# Patient Record
Sex: FEMALE | Employment: FULL TIME | URBAN - METROPOLITAN AREA
[De-identification: names, ages, dates, MRNs, and addresses within clinical notes are randomized per-mention and may not be internally consistent; named-entity substitution may affect disease eponyms.]

---

## 2023-09-08 DIAGNOSIS — M54.50 LUMBAGO: Primary | ICD-10-CM

## 2023-09-13 ENCOUNTER — ANESTHESIA EVENT (OUTPATIENT)
Dept: ENDOSCOPY | Facility: HOSPITAL | Age: 33
DRG: 440 | End: 2023-09-13
Payer: MEDICAID

## 2023-09-13 ENCOUNTER — HOSPITAL ENCOUNTER (INPATIENT)
Facility: HOSPITAL | Age: 33
LOS: 2 days | Discharge: HOME | DRG: 440 | End: 2023-09-15
Attending: STUDENT IN AN ORGANIZED HEALTH CARE EDUCATION/TRAINING PROGRAM | Admitting: STUDENT IN AN ORGANIZED HEALTH CARE EDUCATION/TRAINING PROGRAM
Payer: MEDICAID

## 2023-09-13 DIAGNOSIS — K85.10 ACUTE BILIARY PANCREATITIS WITHOUT INFECTION OR NECROSIS: ICD-10-CM

## 2023-09-13 DIAGNOSIS — R11.2 NAUSEA AND VOMITING, UNSPECIFIED VOMITING TYPE: ICD-10-CM

## 2023-09-13 DIAGNOSIS — R79.89 ELEVATED LFTS: ICD-10-CM

## 2023-09-13 DIAGNOSIS — E80.6 HYPERBILIRUBINEMIA: ICD-10-CM

## 2023-09-13 DIAGNOSIS — K85.90 ACUTE PANCREATITIS, UNSPECIFIED COMPLICATION STATUS, UNSPECIFIED PANCREATITIS TYPE: ICD-10-CM

## 2023-09-13 DIAGNOSIS — R07.9 CHEST PAIN: ICD-10-CM

## 2023-09-13 DIAGNOSIS — R10.13 EPIGASTRIC PAIN: Primary | ICD-10-CM

## 2023-09-13 LAB
ALBUMIN SERPL-MCNC: 4.2 G/DL (ref 3.5–5)
ALBUMIN/GLOB SERPL: 1.4 RATIO (ref 1.1–2)
ALP SERPL-CCNC: 289 UNIT/L (ref 40–150)
ALT SERPL-CCNC: 874 UNIT/L (ref 0–55)
APPEARANCE UR: CLEAR
AST SERPL-CCNC: 1231 UNIT/L (ref 5–34)
B-HCG SERPL QL: NEGATIVE
BACTERIA #/AREA URNS AUTO: NORMAL /HPF
BASOPHILS # BLD AUTO: 0.04 X10(3)/MCL
BASOPHILS NFR BLD AUTO: 0.6 %
BILIRUB SERPL-MCNC: 2.3 MG/DL
BILIRUB UR QL STRIP.AUTO: ABNORMAL
BUN SERPL-MCNC: 10.5 MG/DL (ref 7–18.7)
CALCIUM SERPL-MCNC: 9.5 MG/DL (ref 8.4–10.2)
CHLORIDE SERPL-SCNC: 106 MMOL/L (ref 98–107)
CHOLEST SERPL-MCNC: 176 MG/DL
CHOLEST/HDLC SERPL: 4 {RATIO} (ref 0–5)
CO2 SERPL-SCNC: 19 MMOL/L (ref 22–29)
COLOR UR: ABNORMAL
CREAT SERPL-MCNC: 0.72 MG/DL (ref 0.55–1.02)
EOSINOPHIL # BLD AUTO: 0.03 X10(3)/MCL (ref 0–0.9)
EOSINOPHIL NFR BLD AUTO: 0.5 %
ERYTHROCYTE [DISTWIDTH] IN BLOOD BY AUTOMATED COUNT: 13.3 % (ref 11.5–17)
GFR SERPLBLD CREATININE-BSD FMLA CKD-EPI: >60 MLS/MIN/1.73/M2
GLOBULIN SER-MCNC: 3.1 GM/DL (ref 2.4–3.5)
GLUCOSE SERPL-MCNC: 107 MG/DL (ref 74–100)
GLUCOSE UR QL STRIP.AUTO: NEGATIVE
HAV IGM SERPL QL IA: NONREACTIVE
HBV CORE IGM SERPL QL IA: NONREACTIVE
HBV SURFACE AG SERPL QL IA: NONREACTIVE
HCT VFR BLD AUTO: 38.1 % (ref 37–47)
HCV AB SERPL QL IA: NONREACTIVE
HDLC SERPL-MCNC: 50 MG/DL (ref 35–60)
HGB BLD-MCNC: 12.7 G/DL (ref 12–16)
IMM GRANULOCYTES # BLD AUTO: 0.02 X10(3)/MCL (ref 0–0.04)
IMM GRANULOCYTES NFR BLD AUTO: 0.3 %
KETONES UR QL STRIP.AUTO: NEGATIVE
LDLC SERPL CALC-MCNC: 108 MG/DL (ref 50–140)
LEUKOCYTE ESTERASE UR QL STRIP.AUTO: ABNORMAL
LIPASE SERPL-CCNC: 913 U/L
LYMPHOCYTES # BLD AUTO: 1.78 X10(3)/MCL (ref 0.6–4.6)
LYMPHOCYTES NFR BLD AUTO: 27.1 %
MCH RBC QN AUTO: 28.3 PG (ref 27–31)
MCHC RBC AUTO-ENTMCNC: 33.3 G/DL (ref 33–36)
MCV RBC AUTO: 84.9 FL (ref 80–94)
MONOCYTES # BLD AUTO: 0.39 X10(3)/MCL (ref 0.1–1.3)
MONOCYTES NFR BLD AUTO: 5.9 %
NEUTROPHILS # BLD AUTO: 4.32 X10(3)/MCL (ref 2.1–9.2)
NEUTROPHILS NFR BLD AUTO: 65.6 %
NITRITE UR QL STRIP.AUTO: NEGATIVE
NRBC BLD AUTO-RTO: 0 %
PH UR STRIP.AUTO: 5.5 [PH]
PLATELET # BLD AUTO: 329 X10(3)/MCL (ref 130–400)
PMV BLD AUTO: 10.7 FL (ref 7.4–10.4)
POTASSIUM SERPL-SCNC: 3.9 MMOL/L (ref 3.5–5.1)
PROT SERPL-MCNC: 7.3 GM/DL (ref 6.4–8.3)
PROT UR QL STRIP.AUTO: ABNORMAL
RBC # BLD AUTO: 4.49 X10(6)/MCL (ref 4.2–5.4)
RBC #/AREA URNS AUTO: NORMAL /HPF
RBC UR QL AUTO: ABNORMAL
SODIUM SERPL-SCNC: 135 MMOL/L (ref 136–145)
SP GR UR STRIP.AUTO: 1.03 (ref 1–1.03)
SQUAMOUS #/AREA URNS AUTO: NORMAL /HPF
TRIGL SERPL-MCNC: 89 MG/DL (ref 37–140)
TROPONIN I SERPL-MCNC: <0.01 NG/ML (ref 0–0.04)
UROBILINOGEN UR STRIP-ACNC: 1
VLDLC SERPL CALC-MCNC: 18 MG/DL
WBC # SPEC AUTO: 6.58 X10(3)/MCL (ref 4.5–11.5)
WBC #/AREA URNS AUTO: NORMAL /HPF

## 2023-09-13 PROCEDURE — 21400001 HC TELEMETRY ROOM

## 2023-09-13 PROCEDURE — 80053 COMPREHEN METABOLIC PANEL: CPT | Performed by: STUDENT IN AN ORGANIZED HEALTH CARE EDUCATION/TRAINING PROGRAM

## 2023-09-13 PROCEDURE — 80074 ACUTE HEPATITIS PANEL: CPT | Performed by: PHYSICIAN ASSISTANT

## 2023-09-13 PROCEDURE — 84484 ASSAY OF TROPONIN QUANT: CPT | Performed by: STUDENT IN AN ORGANIZED HEALTH CARE EDUCATION/TRAINING PROGRAM

## 2023-09-13 PROCEDURE — 83690 ASSAY OF LIPASE: CPT | Performed by: STUDENT IN AN ORGANIZED HEALTH CARE EDUCATION/TRAINING PROGRAM

## 2023-09-13 PROCEDURE — 25500020 PHARM REV CODE 255: Performed by: STUDENT IN AN ORGANIZED HEALTH CARE EDUCATION/TRAINING PROGRAM

## 2023-09-13 PROCEDURE — 80061 LIPID PANEL: CPT | Performed by: PHYSICIAN ASSISTANT

## 2023-09-13 PROCEDURE — 25000003 PHARM REV CODE 250: Performed by: STUDENT IN AN ORGANIZED HEALTH CARE EDUCATION/TRAINING PROGRAM

## 2023-09-13 PROCEDURE — 25000003 PHARM REV CODE 250: Performed by: PHYSICIAN ASSISTANT

## 2023-09-13 PROCEDURE — 85025 COMPLETE CBC W/AUTO DIFF WBC: CPT | Performed by: STUDENT IN AN ORGANIZED HEALTH CARE EDUCATION/TRAINING PROGRAM

## 2023-09-13 PROCEDURE — 93010 ELECTROCARDIOGRAM REPORT: CPT | Mod: ,,, | Performed by: INTERNAL MEDICINE

## 2023-09-13 PROCEDURE — 11000001 HC ACUTE MED/SURG PRIVATE ROOM

## 2023-09-13 PROCEDURE — 81001 URINALYSIS AUTO W/SCOPE: CPT | Performed by: STUDENT IN AN ORGANIZED HEALTH CARE EDUCATION/TRAINING PROGRAM

## 2023-09-13 PROCEDURE — 81025 URINE PREGNANCY TEST: CPT | Performed by: STUDENT IN AN ORGANIZED HEALTH CARE EDUCATION/TRAINING PROGRAM

## 2023-09-13 PROCEDURE — 99285 EMERGENCY DEPT VISIT HI MDM: CPT | Mod: 25

## 2023-09-13 PROCEDURE — 93010 EKG 12-LEAD: ICD-10-PCS | Mod: ,,, | Performed by: INTERNAL MEDICINE

## 2023-09-13 PROCEDURE — 93005 ELECTROCARDIOGRAM TRACING: CPT

## 2023-09-13 PROCEDURE — A9577 INJ MULTIHANCE: HCPCS | Performed by: STUDENT IN AN ORGANIZED HEALTH CARE EDUCATION/TRAINING PROGRAM

## 2023-09-13 RX ORDER — SODIUM CHLORIDE 9 MG/ML
INJECTION, SOLUTION INTRAVENOUS CONTINUOUS
Status: DISCONTINUED | OUTPATIENT
Start: 2023-09-13 | End: 2023-09-14

## 2023-09-13 RX ORDER — HYDRALAZINE HYDROCHLORIDE 20 MG/ML
10 INJECTION INTRAMUSCULAR; INTRAVENOUS
Status: DISCONTINUED | OUTPATIENT
Start: 2023-09-13 | End: 2023-09-15 | Stop reason: HOSPADM

## 2023-09-13 RX ORDER — ONDANSETRON 2 MG/ML
4 INJECTION INTRAMUSCULAR; INTRAVENOUS EVERY 6 HOURS PRN
Status: DISCONTINUED | OUTPATIENT
Start: 2023-09-13 | End: 2023-09-15 | Stop reason: HOSPADM

## 2023-09-13 RX ORDER — ACETAMINOPHEN 325 MG/1
650 TABLET ORAL
Status: COMPLETED | OUTPATIENT
Start: 2023-09-13 | End: 2023-09-13

## 2023-09-13 RX ORDER — ZOLPIDEM TARTRATE 10 MG/1
1 TABLET ORAL NIGHTLY PRN
COMMUNITY

## 2023-09-13 RX ORDER — FAMOTIDINE 20 MG/1
40 TABLET, FILM COATED ORAL ONCE
Status: DISCONTINUED | OUTPATIENT
Start: 2023-09-14 | End: 2023-09-15 | Stop reason: HOSPADM

## 2023-09-13 RX ORDER — MORPHINE SULFATE 4 MG/ML
2 INJECTION, SOLUTION INTRAMUSCULAR; INTRAVENOUS EVERY 4 HOURS PRN
Status: DISPENSED | OUTPATIENT
Start: 2023-09-13 | End: 2023-09-15

## 2023-09-13 RX ORDER — AMLODIPINE BESYLATE 5 MG/1
1 TABLET ORAL DAILY
COMMUNITY

## 2023-09-13 RX ORDER — MAG HYDROX/ALUMINUM HYD/SIMETH 200-200-20
30 SUSPENSION, ORAL (FINAL DOSE FORM) ORAL ONCE
Status: COMPLETED | OUTPATIENT
Start: 2023-09-13 | End: 2023-09-13

## 2023-09-13 RX ORDER — AMOXICILLIN 875 MG/1
875 TABLET, FILM COATED ORAL 2 TIMES DAILY
Status: ON HOLD | COMMUNITY
Start: 2023-09-06 | End: 2023-09-15 | Stop reason: HOSPADM

## 2023-09-13 RX ADMIN — SODIUM CHLORIDE: 9 INJECTION, SOLUTION INTRAVENOUS at 11:09

## 2023-09-13 RX ADMIN — ACETAMINOPHEN 325MG 650 MG: 325 TABLET ORAL at 07:09

## 2023-09-13 RX ADMIN — GADOBENATE DIMEGLUMINE 19 ML: 529 INJECTION, SOLUTION INTRAVENOUS at 02:09

## 2023-09-13 RX ADMIN — IOPAMIDOL 100 ML: 755 INJECTION, SOLUTION INTRAVENOUS at 10:09

## 2023-09-13 RX ADMIN — ALUMINUM HYDROXIDE, MAGNESIUM HYDROXIDE, AND SIMETHICONE 30 ML: 200; 200; 20 SUSPENSION ORAL at 07:09

## 2023-09-13 NOTE — ED PROVIDER NOTES
Encounter Date: 9/13/2023    SCRIBE #1 NOTE: I, Laz Villegas, am scribing for, and in the presence of,  Dr. Choe. I have scribed the following portions of the note - the EKG reading. Other sections scribed: HPI, ROS, Physical Exam, MDM, Attending.       History     Chief Complaint   Patient presents with    Abdominal Pain     C/o epigastric pain radiating around right upper abdomen into back, +nausea. States almost feels constipated but having typical BM. Denies vomiting.     34 y/o female presents to ED for epigastric pain radiating to her back onset around 1900 last night while lying on her stomach and reading.  She states she had not just eaten when her pain began.  Pt describes the pain as dull and says she has never had similar pain before.  She complains of nausea as well.  Pt has history of cholecystectomy.  Her LMP was on 8/20.  She denies vomiting, fever, dysuria, hematuria, vaginal bleeding/discharge, cough, chest pain or SOB.    Per chart review, pt was seen in other ED about a year ago for epigastric pain and had elevated liver enzymes.  She had dilated common bile duct, and there was concern for possible passed gall stone at the time.      The history is provided by the patient and medical records.     Review of patient's allergies indicates:  No Known Allergies  Past Medical History:   Diagnosis Date    Hypertension     Migraine headache      Past Surgical History:   Procedure Laterality Date    CHOLECYSTECTOMY       History reviewed. No pertinent family history.  Social History     Tobacco Use    Smoking status: Never     Passive exposure: Never    Smokeless tobacco: Never   Substance Use Topics    Alcohol use: Not Currently     Alcohol/week: 6.0 - 7.0 standard drinks of alcohol     Types: 3 - 4 Cans of beer, 3 Standard drinks or equivalent per week     Comment: states drinks only weekends once or twice a more    Drug use: Never     Review of Systems   Constitutional:  Negative for chills and fever.    HENT:  Negative for congestion, rhinorrhea and sore throat.    Eyes:  Negative for visual disturbance.   Respiratory:  Negative for cough and shortness of breath.    Cardiovascular:  Negative for chest pain and leg swelling.   Gastrointestinal:  Positive for abdominal pain (epigastric) and nausea. Negative for vomiting.   Genitourinary:  Negative for dysuria, hematuria, vaginal bleeding and vaginal discharge.   Musculoskeletal:  Negative for joint swelling.   Skin:  Negative for rash.   Neurological:  Negative for weakness.   Psychiatric/Behavioral:  Negative for confusion.        Physical Exam     Initial Vitals [09/13/23 0653]   BP Pulse Resp Temp SpO2   134/87 100 13 98.3 °F (36.8 °C) 98 %      MAP       --         Physical Exam    Nursing note and vitals reviewed.  Constitutional: She is not diaphoretic. No distress.   Cardiovascular:  Normal rate and regular rhythm.           No murmur heard.  Pulmonary/Chest: Breath sounds normal. No respiratory distress. She has no wheezes. She has no rales.   Abdominal: Abdomen is soft. She exhibits no distension. There is abdominal tenderness (mild epigastric).     Neurological: She is alert.   Psychiatric: She has a normal mood and affect.         ED Course   Procedures  Labs Reviewed   COMPREHENSIVE METABOLIC PANEL - Abnormal; Notable for the following components:       Result Value    Sodium Level 135 (*)     Carbon Dioxide 19 (*)     Glucose Level 107 (*)     Bilirubin Total 2.3 (*)     Alkaline Phosphatase 289 (*)     Alanine Aminotransferase 874 (*)     Aspartate Aminotransferase 1,231 (*)     All other components within normal limits   URINALYSIS, REFLEX TO URINE CULTURE - Abnormal; Notable for the following components:    Protein, UA 1+ (*)     Blood, UA Trace (*)     Bilirubin, UA 2+ (*)     Leukocyte Esterase, UA Trace (*)     All other components within normal limits   LIPASE - Abnormal; Notable for the following components:    Lipase Level 913 (*)     All  other components within normal limits   CBC WITH DIFFERENTIAL - Abnormal; Notable for the following components:    MPV 10.7 (*)     All other components within normal limits   TROPONIN I - Normal   URINALYSIS, MICROSCOPIC - Normal   PREGNANCY TEST, URINE RAPID - Normal   CBC W/ AUTO DIFFERENTIAL    Narrative:     The following orders were created for panel order CBC auto differential.  Procedure                               Abnormality         Status                     ---------                               -----------         ------                     CBC with Differential[[id number]]       Abnormal            Final result                 Please view results for these tests on the individual orders.   LIPID PANEL     EKG Readings: (Independently Interpreted)   Initial Reading: No STEMI. Rhythm: Sinus Tachycardia. Heart Rate: 101. Ectopy: No Ectopy. Conduction: RBBB. ST Segments: Normal ST Segments. T Waves Flipped: III. Axis: Left Harrison Deviation. [facility] ED EKG2 - Other Findings: no ischemic changes.   0653  No prior EKG for comparison     ECG Results              EKG 12-lead (Final result)  Result time 09/13/23 08:44:48      Final result by Interface, Lab In [facility] (09/13/23 08:44:48)                   Narrative:    Test Reason : R10.13,    Vent. Rate : 101 BPM     Atrial Rate : 101 BPM     P-R Int : 148 ms          QRS Dur : 144 ms      QT Int : 388 ms       P-R-T Axes : 053 -38 011 degrees     QTc Int : 503 ms    Sinus tachycardia  Left axis deviation  Right bundle branch block  Inferior infarct ,age undetermined  Abnormal ECG  Confirmed by Chace VALLE, Dedrick ([id number]) on 9/13/2023 8:44:37 AM    Referred By:             Confirmed By:Dedrick Mas MD                                  Imaging Results              MRI Abdomen W WO Contrast_INC MRCP (XPD) (Final result)  Result time 09/13/23 14:53:41      Final result by Alice Gutierrez MD (09/13/23 14:53:41)                   Impression:      1. No biliary ductal  dilatation or evidence of choledocholithiasis  2. Mild pancreatic edema  3. Mild hepatic steatosis      Electronically signed by: Alice Gutierrez  Date:    09/13/2023  Time:    14:53               Narrative:    EXAMINATION:  MRI ABDOMEN WITH AND WO_INC MRCP (XPD)    CLINICAL HISTORY:  Pancreatitis, acute, severe;r/o choledocholithiasis, acute pancreatitis w/ elevated LFTs. s/p lap julius 5/22;    TECHNIQUE:  Multiplanar, multisequence MRI of the abdomen performed prior to and after intravenous administration of gadolinium based contrast.  3D and MIP heavily T2 weighted MRCP sequences included.    COMPARISON:  CT abdomen pelvis 09/13/2023    FINDINGS:  LIVER: Mild loss of signal within the liver on out of phase imaging compatible with mild steatosis.    BILIARY: Gallbladder is surgically absent.  No biliary ductal dilatation.  Common bile duct measures 4-5 mm.  No appreciable intraductal stone.      PANCREAS: Mild pancreatic edema.  No pancreatic ductal dilatation.  No evidence of necrosis.    SPLEEN: Normal.    ADRENALS: Normal.    KIDNEYS: Symmetric. No hydronephrosis.    BOWEL: Unremarkable.    BONES: Normal marrow signal.    OTHER: N/A                                           CT Abdomen Pelvis With Contrast (Final result)  Result time 09/13/23 10:46:32      Final result by Ayad Ng MD (09/13/23 10:46:32)                   Impression:      Mild antral wall thickening/gastritis versus underdistention.    Small amount of free fluid the pelvis, possibly physiologic.    Otherwise, unremarkable CT of the abdomen pelvis.      Electronically signed by: Ayad Ng MD  Date:    09/13/2023  Time:    10:46               Narrative:    EXAMINATION:  CT of the abdomen pelvis with contrast    CLINICAL HISTORY:  Epigastric pain    COMPARISON:  None    TECHNIQUE:  Routine CT of the abdomen pelvis performed with intravenous contrast.  Patient received 100 cc of Isovue 370    Total DLP: 556 mGy.cm    Automatic exposure  control was utilized to reduce the patient's dose    FINDINGS:  The visualized lung bases are clear.    The liver, spleen, pancreas, adrenal glands, and kidneys appear unremarkable.  The gallbladder is absent.    The abdominal aorta normal caliber.  No abdominopelvic adenopathy.    There is underdistention versus mild wall thickening of the stomach antrum.  The bowel loops are normal caliber.  The appendix appears normal.  There is trace free fluid in posterior cul-de-sac, possibly physiologic.  No fluid collection or free air.    No osseous destructive process.                                       Medications   hydrALAZINE injection 10 mg (has no administration in time range)   0.9%  NaCl infusion ( Intravenous New Bag 9/13/23 2328)   ondansetron injection 4 mg (4 mg Intravenous Not Given 9/13/23 1859)   morphine injection 2 mg (2 mg Intravenous Not Given 9/13/23 1858)   famotidine tablet 40 mg (has no administration in time range)   aluminum-magnesium hydroxide-simethicone 200-200-20 mg/5 mL suspension 30 mL (30 mLs Oral Given 9/13/23 0749)   iopamidoL (ISOVUE-370) injection 100 mL (100 mLs Intravenous Given 9/13/23 1039)   gadobenate dimeglumine (MULTIHANCE) injection 19 mL (19 mLs Intravenous Given 9/13/23 1428)   acetaminophen tablet 650 mg (650 mg Oral Given 9/13/23 1915)             Scribe Attestation:   Scribe #1: I performed the above scribed service and the documentation accurately describes the services I performed. I attest to the accuracy of the note.    Attending Attestation:           Physician Attestation for Scribe:  Physician Attestation Statement for Scribe #1: I, reviewed documentation, as scribed by Laz Villegas in my presence, and it is both accurate and complete.         Medical Decision Making  Problems Addressed:  Acute pancreatitis, unspecified complication status, unspecified pancreatitis type: acute illness or injury that poses a threat to life or bodily functions  Elevated LFTs: acute  illness or injury that poses a threat to life or bodily functions  Epigastric pain: acute illness or injury that poses a threat to life or bodily functions  Hyperbilirubinemia: acute illness or injury that poses a threat to life or bodily functions  Nausea and vomiting, unspecified vomiting type: acute illness or injury that poses a threat to life or bodily functions    Amount and/or Complexity of Data Reviewed  Radiology: ordered.    Risk  OTC drugs.  Decision regarding hospitalization.        ED assessment:    34 yo with history of cholecystectomy presenting for epigastric abd pain  Presented a year ago to [facility] with similar pain - had transaminitis and hyperbili, CBD dilatation  Ultitmately MRCP was negative, had elective julius and discharged  Labs today with hyperbili, elevated lipase and LFTs  Will obtain CT, consult Gi and admit to hospitalist     Differential diagnosis (including but not limited to):   Judging by the patient's chief complaint and pertinent history, the patient has the following possible differential diagnoses, including but not limited to the following.  Some of these are deemed to be lower likelihood and some more likely based on my physical exam and history combined with possible lab work and/or imaging studies.   Please see the pertinent studies, and refer to the HPI.  Some of these diagnoses will take further evaluation to fully rule out, perhaps as an outpatient and the patient was encouraged to follow up when discharged for more comprehensive evaluation.    appendicitis, biliary disease, diverticulitis,  AAA, ACS, mesenteric ischemia, intraabdominal abcess, retroperitoneal abcess, gastritis, gastroenteritis, hepatitis, hernia, pancreatitis, inflammatory bowel disease, PUD, SBP, nephrolithiasis, DKA, sickle cell crisis, consitpation, GERD, IBS      ED management:  IVF  Admission  GI consultation       Amount and/or Complexity of Data Reviewed  Independent historian: none  External data  reviewed: notes from previous ED visits, prior labs, prior EKGs, prior imaging, and prescription medications   Summary of data reviewed: Per chart review, pt was seen in other ED about a year ago for epigastric pain and had elevated liver enzymes.  She had dilated common bile duct, and there was concern for possible passed gall stone at the time.    Risk and benefits of testing: discussed   Labs: ordered and reviewed  Radiology: ordered and independent interpretation performed (see above or ED course)  ECG/medicine tests: ordered and independent interpretation performed (see above or ED course)  Discussion of management or test interpretation with external provider(s): discussed with hospitalist physician and discussed with GI consultant   Summary of discussion: see ED course     Risk  Decision regarding hospitalization    Critical Care  none    Harley FORREST MD personally performed the history, PE, MDM, and procedures as documented above and agree with the scribe's documentation.         ED Course as of 09/14/23 0355   Wed Sep 13, 2023   0751 Labs suggestive of choledocholithiasis or biliary obstruction with pancreatitis. Will obtain CT to further assess.  [AC]   0936 There have been delays in obtaining CT scan [AC]   1021 Denies alcohol use  [AC]   1023 Dicussed with GI - they will come evaluate  [AC]      ED Course User Index  [AC] Harley Choe IV, MD                      Clinical Impression:   Final diagnoses:  [R10.13] Epigastric pain (Primary)  [R11.2] Nausea and vomiting, unspecified vomiting type  [K85.90] Acute pancreatitis, unspecified complication status, unspecified pancreatitis type  [R79.89] Elevated LFTs  [E80.6] Hyperbilirubinemia        ED Disposition Condition    Admit Stable                Harley Choe IV, MD  09/14/23 5876

## 2023-09-13 NOTE — PLAN OF CARE
Pt is , 2 children, no living will or POA.    09/13/23 1238   Discharge Assessment   Assessment Type Discharge Planning Assessment   Confirmed/corrected address, phone number and insurance Yes   Confirmed Demographics Contacted registration to update  (notified Heidi of address change: [address] [place])   Source of Information patient   When was your last doctors appointment?   (PCP Pilar Henry)   Communicated DONALD with patient/caregiver Date not available/Unable to determine   People in Home parent(s)   Do you expect to return to your current living situation? Yes   Do you have help at home or someone to help you manage your care at home? Yes   Who are your caregiver(s) and their phone number(s)? lives with father. Identified motherKaley as support [phone]   Prior to hospitilization cognitive status: Unable to Assess   Current cognitive status: Alert/Oriented   Walking or Climbing Stairs   (none)   Dressing/Bathing   (none\)   Home Accessibility wheelchair accessible   Home Layout Able to live on 1st floor   Equipment Currently Used at Home blood pressure machine   Readmission within 30 days? No   Patient currently being followed by outpatient case management? No   Do you currently have service(s) that help you manage your care at home? No   Do you take prescription medications? Yes  (Fills with [facility] [place])   Do you have prescription coverage? Yes  ([employer] medicaid)   Do you have any problems affording any of your prescribed medications? No   Is the patient taking medications as prescribed? yes   Who is going to help you get home at discharge? drive self   How do you get to doctors appointments? car, drives self   Are you on dialysis? No   Do you take coumadin? No   DME Needed Upon Discharge  none   Discharge Plan discussed with: Patient   Transition of Care Barriers None   Discharge Plan A Home with family   Physical Activity   On average, how many days per week do you engage in  moderate to strenuous exercise (like a brisk walk)? 1 day   On average, how many minutes do you engage in exercise at this level? 30 min   Financial Resource Strain   How hard is it for you to pay for the very basics like food, housing, medical care, and heating? Not hard   Housing Stability   In the last 12 months, was there a time when you were not able to pay the mortgage or rent on time? N   In the last 12 months, how many places have you lived? 2   In the last 12 months, was there a time when you did not have a steady place to sleep or slept in a shelter (including now)? N   Transportation Needs   In the past 12 months, has lack of transportation kept you from medical appointments or from getting medications? no   In the past 12 months, has lack of transportation kept you from meetings, work, or from getting things needed for daily living? No   Food Insecurity   Within the past 12 months, you worried that your food would run out before you got the money to buy more. Never true   Within the past 12 months, the food you bought just didn't last and you didn't have money to get more. Never true   Stress   Do you feel stress - tense, restless, nervous, or anxious, or unable to sleep at night because your mind is troubled all the time - these days? Not at all   Social Connections   In a typical week, how many times do you talk on the phone with family, friends, or neighbors? More than 3   How often do you get together with friends or relatives? More than 3   How often do you attend Yazidi or Anabaptist services? More than 4   Do you belong to any clubs or organizations such as Yazidi groups, unions, fraternal or athletic groups, or school groups? No   How often do you attend meetings of the clubs or organizations you belong to? Never   Are you , , , , never , or living with a partner?    Alcohol Use   Q1: How often do you have a drink containing alcohol? 2-4 pr month   Q2:  How many drinks containing alcohol do you have on a typical day when you are drinking? 3 or 4   Q3: How often do you have six or more drinks on one occasion? Never   OTHER   Name(s) of People in Home lives with father

## 2023-09-13 NOTE — H&P
[facility] Hospital Medicine History & Physical Examination       Patient Name: Autumn Alegria  MRN: [id number]  Patient Class: IP- Inpatient   Admission Date: 9/13/2023   Admitting Physician: Raheem Mcfarland MD   Length of Stay: 0  Attending Physician: Raheem Mcfarland MD   Primary Care Provider: Rachel Henry NP  Face-to-Face encounter date: 09/13/2023  Code Status: Full Code  Chief Complaint: Abdominal Pain (C/o epigastric pain radiating around right upper abdomen into back, +nausea. States almost feels constipated but having typical BM. Denies vomiting.)        Patient information was obtained from patient, patient's family, past medical records and ER records.     HISTORY OF PRESENT ILLNESS:   Autumn Alegria is a 33 y.o. female with a past medical history of hypertension. The patient presented to [facility] on 9/13/2023 with a primary complaint of epigastric abdominal pain to the back and associated nausea and chills which began yesterday (09/12/2023) evening. She denies complaints of chest pain, shortness a breath, vomiting, diarrhea, cough.  Patient reports he drinks a few beers every other weekend but never has had history of heavy alcohol use or pancreatitis before. Patient reports history of cholecystectomy approximally year ago.    Upon presentation to the ED, temperature 93° F, heart rate 100, blood pressure 130/87, respiratory rate 17 and SpO2 98% on room air. Labs with alkaline phosphatase 289, total bilirubin 2.3, AST 1231, , lipase 913, troponin less than 0.01.  UA negative for infection.  CT abdomen and pelvis with mild anterior wall thickening/gastritis versus underdistention, small amount of free fluid in the pelvis possibly physiologic but otherwise unremarkable CT.  CT abdomen pelvis with sinus tachycardia with ventricular rate of 101, right bundle-branch block and age-indeterminate inferior infarct. In the ED patient received Mylanta.  Patient is admitted to hospital  medicine services for further medical management.    PAST MEDICAL HISTORY:   Hypertension    PAST SURGICAL HISTORY:   Cholecystectomy  Tonsillectomy    ALLERGIES:   Patient has no known allergies.    FAMILY HISTORY:   Reviewed and negative    SOCIAL HISTORY:   Denies tobacco and illicit drug use   Drinks a few beers every other weekend    HOME MEDICATIONS:     Prior to Admission medications    Medication Sig Start Date End Date Taking? Authorizing Provider   amLODIPine (NORVASC) 5 MG tablet Take 1 tablet by mouth once daily.   Yes Provider, Historical   amoxicillin (AMOXIL) 875 MG tablet Take 875 mg by mouth 2 (two) times daily. 9/6/23  Yes Provider, Historical   zolpidem (AMBIEN) 10 mg Tab 1 tablet nightly as needed.   Yes Provider, Historical       REVIEW OF SYSTEMS:   Except as documented, all other systems reviewed and negative     PHYSICAL EXAM:     VITAL SIGNS: 24 HRS MIN & MAX LAST   Temp  Min: 98.3 °F (36.8 °C)  Max: 98.3 °F (36.8 °C) 98.3 °F (36.8 °C)   BP  Min: 118/94  Max: 135/91 (!) 118/94   Pulse  Min: 73  Max: 100  80   Resp  Min: 13  Max: 23 (!) 23   SpO2  Min: 98 %  Max: 100 % 100 %       General appearance: Well-developed, well-nourished female in no apparent distress.  Boyfriend at bedside.  HEENT: Atraumatic head. Moist mucous membranes of oral cavity.  Lungs: Clear to auscultation bilaterally.   Heart: Regular rate and rhythm.   Abdomen: Soft, non-distended, non-tender. Bowel sounds are normal.   Extremities: No cyanosis, clubbing. No deformities.  Skin: No Rash. Warm and dry.  Neuro: Awake, alert and oriented. Motor and sensory exams grossly intact.  Psych/mental status: Appropriate mood and affect. Cooperative. Responds appropriately to questions.       LABS AND IMAGING:     Recent Labs   Lab 09/13/23  0708   WBC 6.58   RBC 4.49   HGB 12.7   HCT 38.1   MCV 84.9   MCH 28.3   MCHC 33.3   RDW 13.3      MPV 10.7*       Recent Labs   Lab 09/13/23  0708   *   K 3.9   CO2 19*   BUN 10.5    CREATININE 0.72   CALCIUM 9.5   ALBUMIN 4.2   ALKPHOS 289*   *   AST 1,231*   BILITOT 2.3*       Microbiology Results (last 7 days)       ** No results found for the last 168 hours. **             CT Abdomen Pelvis With Contrast  Narrative: EXAMINATION:  CT of the abdomen pelvis with contrast    CLINICAL HISTORY:  Epigastric pain    COMPARISON:  None    TECHNIQUE:  Routine CT of the abdomen pelvis performed with intravenous contrast.  Patient received 100 cc of Isovue 370    Total DLP: 556 mGy.cm    Automatic exposure control was utilized to reduce the patient's dose    FINDINGS:  The visualized lung bases are clear.    The liver, spleen, pancreas, adrenal glands, and kidneys appear unremarkable.  The gallbladder is absent.    The abdominal aorta normal caliber.  No abdominopelvic adenopathy.    There is underdistention versus mild wall thickening of the stomach antrum.  The bowel loops are normal caliber.  The appendix appears normal.  There is trace free fluid in posterior cul-de-sac, possibly physiologic.  No fluid collection or free air.    No osseous destructive process.  Impression: Mild antral wall thickening/gastritis versus underdistention.    Small amount of free fluid the pelvis, possibly physiologic.    Otherwise, unremarkable CT of the abdomen pelvis.    Electronically signed by: Ayad Ng MD  Date:    09/13/2023  Time:    10:46        ASSESSMENT & PLAN:   Assessment:  Acute pancreatitis  Gastritis versus underdistention of the stomach  Transaminitis  Hyperbilirubinemia  History of hypertension    Plan:  - GI consulted an MRCP ordered.  Follow results   - NPO   - IV fluid hydration  - IV morphine as needed for pain  - Lipid panel ordered  - Resume appropriate home medications when deemed necessary   - Labs in AM      VTE Prophylaxis: will be placed on SCD for DVT prophylaxis and will be advised to be as mobile as possible and sit in a chair as  tolerated      __________________________________________________________________________  INPATIENT LIST OF MEDICATIONS     Current Facility-Administered Medications:     0.9%  NaCl infusion, , Intravenous, Continuous, Essence Lunsford PA-C, Last Rate: 125 mL/hr at 09/13/23 1100, New Bag at 09/13/23 1100    hydrALAZINE injection 10 mg, 10 mg, Intravenous, Q2H PRN, Essence Lunsford PA-C    ondansetron injection 4 mg, 4 mg, Intravenous, Q6H PRN, Essence Lunsford PA-C    Current Outpatient Medications:     amLODIPine (NORVASC) 5 MG tablet, Take 1 tablet by mouth once daily., Disp: , Rfl:     amoxicillin (AMOXIL) 875 MG tablet, Take 875 mg by mouth 2 (two) times daily., Disp: , Rfl:     zolpidem (AMBIEN) 10 mg Tab, 1 tablet nightly as needed., Disp: , Rfl:       Scheduled Meds:  Continuous Infusions:   sodium chloride 0.9% 125 mL/hr at 09/13/23 1100     PRN Meds:.hydrALAZINE, ondansetron      Discharge Planning and Disposition: Anticipated discharge to be determined.    IEssence PA, have reviewed and discussed the case with Dr. Raheem Mcfarland MD    Please see the following addendum for further assessment and plan from there attending MD.    Essence Lunsford PA-C  09/13/2023      Seen and examined the patient.  Agree with abovew history physical exam and management.    MRI with and without contrast was ordered and pending results.    GI is consulted.    Continue IV fluids pain management NPO.    Check lipid panel  Advance diet as tolerated.    Raheem Mcfarland M.D.  [facility]/Hospital Medicine Department  [facility]

## 2023-09-13 NOTE — Clinical Note
Diagnosis: Epigastric pain [232711]   Admitting Provider:: MARELY ESPOSITO [[id number]]   Future Attending Provider: MARELY ESPOSITO [[id number]]   Reason for IP Medical Treatment  (Clinical interventions that can only be accomplished in the IP setting? ) :: GI, further workup of LFT elevation   I certify that Inpatient services for greater than or equal to 2 midnights are medically necessary:: Yes   Plans for Post-Acute care--if anticipated (pick the single best option):: A. No post acute care anticipated at this time   Special Needs:: No Special Needs [1]

## 2023-09-13 NOTE — CONSULTS
"Gastroenterology Consult    Reason for Consult:     Acute pancreatitis    HPI:  Autumn Alegria is a 33 y.o. female known to Dr. Dougherty from inpatient care in 2022, w/ pmhx of HTN and inpatient cholecystectomy at [facility] May 2022 for cholelithiasis. We were consulted May 2022 for acute epigastric pain, elevated LFTs. MRCP was negative for choledocholithiasis, therefore we deferred ERCP and she had lap julius 5/30/22 by Dr. Brewer. Tbili was 1.8 upon discharge. States she was in usual state of health after that.     She now presents to the ED w/ acute epigastric pain since 1900 last night. She was lying on her stomach, reading when the pain began. She had not eaten recent meal. Pain radiates into her back. Abd soft. No associated n/v. She takes daily amlodipine and ambien. No anticoags. For the past week she has taken ibuprofen 1-2x/day for low back strain in addition to tylenol and baclofen, but does not typically take a lot of ibuprofen    Tbili 2.3  AST 1231      Lipase 913  CT abd/pelv w/ contrast 9/13/23 "mild antral wall thickening/gastritis vs. Underdistention."      PCP:  No, Primary Doctor    Review of patient's allergies indicates:  No Known Allergies    Current Facility-Administered Medications   Medication Dose Route Frequency Provider Last Rate Last Admin    0.9%  NaCl infusion   Intravenous Continuous Essence Lunsford PA-C        hydrALAZINE injection 10 mg  10 mg Intravenous Q2H PRN Essence Lunsford PA-C        ondansetron injection 4 mg  4 mg Intravenous Q6H PRN Essence Lunsford PA-C         No current outpatient medications on file.     (Not in a hospital admission)      Past Medical History:  No past medical history on file.    Past Surgical History:  No past surgical history on file.    Family History:  No family history on file.    Social History:  Social History     Tobacco Use    Smoking status: Not on file    Smokeless tobacco: Not on file   Substance Use Topics    Alcohol use: " OFFICE VISIT 3/20/2023    CARDIOLOGY CONSULTATION    Chief Complaint   Patient presents with   • Consultation     new pt eval CAD         REFERRING PHYSICIAN    Self referral     REASON FOR CONSULTATION    Coronary artery disease, second opinion     HISTORY OF PRESENT ILLNESS:    Arnaldo Vega is a 57 year old male with a history of coronary artery disease who is here today for consultation and a second opinion regarding his cardiac disease.     In September 2022, he was sent from his PCP to the ED for nonspecific chest pain. He was admitted for NSTEMI and at that time and had a cardiac catheterization which resulted in circumflex/OM stenting with total occlusion of RCA which was collateralized from left coronary artery. There was concern for RCA spontaneous dissection. CT of coronary's was then performed on 10/2022 and RCA was deemed totally occluded. A second catheterization was then performed and noted RCA occulusion at the ostium. Opted for medical management at that time and followed up in 3 months. During his appointment with Dr. Correa in March, he continues to describe his chest pain with exertion. They decided to attempt an additional PCI which was unsuccessful. He was then referred to CTS for bypass. He is currently scheduled for CABG x1 on 03/24/2023.     Today, he continues to complain of chest pain with and without exertion.  He does however feel that he has been able to be more active since stents were placed in 09/2022. He notes that he has twinges in his chest that only last a couple of seconds. He continues to have pain in his neck as well. This only occurs with exertion. After moving furniture for 4-5 minutes, he feels very fatigued. His wife reports that he becomes very pale as well. He can't walk up an incline and hold a conversation at the same time, but he doesn't feel short of breath, mostly just fatigue. 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 He tries to walk his dog as often as  Not on file           Review of Systems:    Review of Systems   Constitutional:  Negative for fever and unexpected weight change.   Respiratory:  Negative for cough and shortness of breath.    Cardiovascular:  Negative for chest pain and leg swelling.   Gastrointestinal:  Positive for abdominal pain. Negative for blood in stool, diarrhea, nausea and vomiting.   Musculoskeletal:  Positive for back pain. Negative for myalgias.   Skin:  Negative for color change and pallor.   Neurological:  Negative for speech difficulty and weakness.   All other systems reviewed and are negative.      Objective:      VITAL SIGNS: 24 HR MIN & MAX LAST  Temp  Min: 98.3 °F (36.8 °C)  Max: 98.3 °F (36.8 °C) 98.3 °F (36.8 °C)  BP  Min: 134/87  Max: 135/91 (!) 135/91  Pulse  Min: 85  Max: 100 85  Resp  Min: 13  Max: 21 (!) 21  SpO2  Min: 98 %  Max: 100 % 98 %    No intake or output data in the 24 hours ending 09/13/23 1022    Physical Exam  Vitals and nursing note reviewed.   Constitutional:       Appearance: Normal appearance.   HENT:      Head: Normocephalic and atraumatic.   Eyes:      General: No scleral icterus.     Extraocular Movements: Extraocular movements intact.   Cardiovascular:      Rate and Rhythm: Normal rate and regular rhythm.      Heart sounds: Normal heart sounds.   Pulmonary:      Effort: Pulmonary effort is normal. No respiratory distress.      Breath sounds: Normal breath sounds.   Abdominal:      General: Abdomen is flat. There is no distension.      Palpations: Abdomen is soft.      Tenderness: There is abdominal tenderness.   Musculoskeletal:         General: No swelling or deformity. Normal range of motion.      Right lower leg: No edema.      Left lower leg: No edema.   Skin:     General: Skin is warm and dry.   Neurological:      General: No focal deficit present.      Mental Status: She is alert and oriented to person, place, and time.   Psychiatric:         Mood and Affect: Mood normal.         Behavior:  he can.     He has a history of tobacco abuse, quit about 7 years ago. He snores at night, and he has a history of sleep apnea. He notes that he is unable to use a CPAP due to intolerance with the mask.     Cardiac Cath 3/9/2022  Left main: Nonobstructive  LAD: Proximal portion 70% stenosis by IVUS (minimal luminal area 3.5 mm²), distal vessel with mild irregularity.  LCx: Patent prior stent from circumflex to OM  RCA: Occluded at the ostium, distal vessel received collateral from left coronary artery, grade 2    Cath: 12/01/2022  Cath: 12/1/2022 - Coronary angiogram, failed angioplasty of RCA  The conscious sedation was achieved by intravenous administration Versed and fentanyl face-to-face contact time 60 minutes Heparin used anticoagulant.  Angio-Seal used No complication Findings: Left main: No obstruction. LAD: Proximal portion 30% stenosis, distal vessel with diffuse atherosclerosis. Left circumflex: Patent prior stents RCA: Occluded at the ostium, receiving collateral from right coronary artery.    Cath: 9/7/2022 -Left Heart Cath W Poss PCI Left heart cath, coronary angiogram, attempted PCI of the right coronary artery and a failed likely due to spontaneous dissection. 3.0x28 Xience stent placed at the circumflex, 2.5x26 Orsiro stent placed at OM 3. The conscious sedation was achieved by intravenously administration Versed and fentanyl, the face-to-face contact time 35 minutes.  Findings: Left main: No obstruction. Left circumflex: At midportion 70 to 75% hazy stenosis. Distal circumflex supply collateral to the right coronary artery OM 3 large at the proximal to midportion has 80% stenosis.  LAD: Mild calcification the proximal portion with diffuse atherosclerosis and tightest stenosis about 30%. RCA, occluded at the proximal portion with evidence suggest spontaneous dissection. Elevated LVEDP at 18mmHg    CTA Coronary 10/31/2022  CTA Coronary: 10/31/2022 - 1. Dilated proximal RCA without visible flow in  Behavior normal.         Recent Results (from the past 48 hour(s))   Urinalysis, Reflex to Urine Culture    Collection Time: 09/13/23  6:56 AM    Specimen: Urine   Result Value Ref Range    Color, UA Dark Yellow Yellow, Light-Yellow, Dark Yellow, Joi, Straw    Appearance, UA Clear Clear    Specific Gravity, UA 1.030 1.005 - 1.030    pH, UA 5.5 5.0 - 8.5    Protein, UA 1+ (A) Negative    Glucose, UA Negative Negative, Normal    Ketones, UA Negative Negative    Blood, UA Trace (A) Negative    Bilirubin, UA 2+ (A) Negative    Urobilinogen, UA 1.0 0.2, 1.0, Normal    Nitrites, UA Negative Negative    Leukocyte Esterase, UA Trace (A) Negative   Urinalysis, Microscopic    Collection Time: 09/13/23  6:56 AM   Result Value Ref Range    RBC, UA 0-5 None Seen, 0-2, 3-5, 0-5 /HPF    WBC, UA 0-5 None Seen, 0-2, 3-5, 0-5 /HPF    Squamous Epithelial Cells, UA 0-4 0-4, None Seen /HPF    Bacteria, UA None Seen None Seen, Rare, Occasional /HPF   Pregnancy, urine rapid    Collection Time: 09/13/23  6:56 AM   Result Value Ref Range    Beta hCG Qualitative, Urine Negative Negative   Comprehensive metabolic panel    Collection Time: 09/13/23  7:08 AM   Result Value Ref Range    Sodium Level 135 (L) 136 - 145 mmol/L    Potassium Level 3.9 3.5 - 5.1 mmol/L    Chloride 106 98 - 107 mmol/L    Carbon Dioxide 19 (L) 22 - 29 mmol/L    Glucose Level 107 (H) 74 - 100 mg/dL    Blood Urea Nitrogen 10.5 7.0 - 18.7 mg/dL    Creatinine 0.72 0.55 - 1.02 mg/dL    Calcium Level Total 9.5 8.4 - 10.2 mg/dL    Protein Total 7.3 6.4 - 8.3 gm/dL    Albumin Level 4.2 3.5 - 5.0 g/dL    Globulin 3.1 2.4 - 3.5 gm/dL    Albumin/Globulin Ratio 1.4 1.1 - 2.0 ratio    Bilirubin Total 2.3 (H) <=1.5 mg/dL    Alkaline Phosphatase 289 (H) 40 - 150 unit/L    Alanine Aminotransferase 874 (H) 0 - 55 unit/L    Aspartate Aminotransferase 1,231 (H) 5 - 34 unit/L    eGFR >60 mls/min/1.73/m2   Lipase    Collection Time: 09/13/23  7:08 AM   Result Value Ref Range    Lipase  the lumen. Given the history, this probably represents the sequelae of the known dissection with chronic thrombus. 2. Appropriate opacification of the mid and distal RCA which could be due to thin nonvisualized flow through the dissected segment or retrograde flow. 3. Appropriate opacification distal to the circumflex artery stent, suggesting patency. 4.Significant calcified and mild noncalcified atherosclerotic plaque throughout the coronary arteries, including up to 75 percent luminal narrowing in the mid and distal RCA, as detailed above. 5. Within the limitations of CT, normal LEFT ventricular wall motion  with a calculated ejection fraction of 76 percent.     PAST MEDICAL HISTORY:    There are no problems to display for this patient.      PAST SURGICAL HISTORY:    No past surgical history on file.    SOCIAL HISTORY:  Social History     Socioeconomic History   • Marital status: /Civil Union     Spouse name: Not on file   • Number of children: Not on file   • Years of education: Not on file   • Highest education level: Not on file   Occupational History   • Not on file   Tobacco Use   • Smoking status: Former     Types: Cigarettes     Quit date: 3/1/2015     Years since quittin.0   • Smokeless tobacco: Never   Substance and Sexual Activity   • Alcohol use: Not on file   • Drug use: Not on file   • Sexual activity: Not on file   Other Topics Concern   • Not on file   Social History Narrative   • Not on file     Social Determinants of Health     Financial Resource Strain: Not on file   Food Insecurity: Not on file   Transportation Needs: Not on file   Physical Activity: Not on file   Stress: Not on file   Social Connections: Not on file   Intimate Partner Violence: Not on file       FAMILY HISTORY:  family history is not on file.    MEDICATIONS:  Outpatient Medications Marked as Taking for the 3/20/23 encounter (Office Visit) with Marcelino Vaca MD   Medication Sig Dispense Refill   • isosorbide  Level 913 (H) <=60 U/L   CBC with Differential    Collection Time: 09/13/23  7:08 AM   Result Value Ref Range    WBC 6.58 4.50 - 11.50 x10(3)/mcL    RBC 4.49 4.20 - 5.40 x10(6)/mcL    Hgb 12.7 12.0 - 16.0 g/dL    Hct 38.1 37.0 - 47.0 %    MCV 84.9 80.0 - 94.0 fL    MCH 28.3 27.0 - 31.0 pg    MCHC 33.3 33.0 - 36.0 g/dL    RDW 13.3 11.5 - 17.0 %    Platelet 329 130 - 400 x10(3)/mcL    MPV 10.7 (H) 7.4 - 10.4 fL    Neut % 65.6 %    Lymph % 27.1 %    Mono % 5.9 %    Eos % 0.5 %    Basophil % 0.6 %    Lymph # 1.78 0.6 - 4.6 x10(3)/mcL    Neut # 4.32 2.1 - 9.2 x10(3)/mcL    Mono # 0.39 0.1 - 1.3 x10(3)/mcL    Eos # 0.03 0 - 0.9 x10(3)/mcL    Baso # 0.04 <=0.2 x10(3)/mcL    IG# 0.02 0 - 0.04 x10(3)/mcL    IG% 0.3 %    NRBC% 0.0 %   Troponin I    Collection Time: 09/13/23  7:08 AM   Result Value Ref Range    Troponin-I <0.010 0.000 - 0.045 ng/mL       No results found.      Assessment & Plan:  33 y.o. female known to Dr. Dougherty from inpatient care in 2022, w/ pmhx of HTN and inpatient cholecystectomy at [facility] May 2022 for cholelithiasis. Admitted with acute pancreatitis    Acute pancreatitis  Elevated LFTs  - 2/3 diagnostic criteria med given lipase >900 and epigastric pain radiating into back c/w pancreatitis, regardless of CT results  - etiology likely biliary. obtain lipid panel for thoroughness. no medication culprits. Minimal to moderate etoh use, she drinks a few beers on weekends. Does not seem enough etoh use to induce pancreatitis. Given elevated LFTs very suspicious for choledocholithiasis   - tbili 2.3  - AST 1231  -   -     Recent cholecystectomy  - May 2022 there was concern for choledocholithiasis, tbili was 2.2 at [facility]. MRCP negative for choledocholithiasis.  - S/p lap julius 5/30/22    CT abd/pelv w/ contrast  today Is negative for biliary dilation. Obtain MRCP    - will discuss timing for ERCP w/ Dr. Martinez and GI team if MRCP reveals biliary dilation or obstruction  - continue supportive  mononitrate (IMDUR) 30 MG 24 hr tablet 30mg twice daily     • atorvastatin (LIPITOR) 80 MG tablet 80mg daily     • clopidogrel (PLAVIX) 75 MG tablet 75mg daily     • metoPROLOL succinate (TOPROL-XL) 25 MG 24 hr tablet 25mg daily     • aspirin 81 MG EC tablet Take 81 mg by mouth daily.     • metFORMIN (GLUCOPHAGE-XR) 500 MG 24 hr tablet 500mg daily with breakfast     • Cholecalciferol (VITAMIN D-3 PO) Daily         ALLERGIES:  No Known Allergies    I have personally reviewed and updated the following EPIC sections: Current medications, Allergies, Problem list, Past Medical History, Past Surgical History, Social History and Family History    REVIEW OF SYSTEMS:   Constitutional: There is no history of fevers or chills. + positive for fatigue   Eyes: Patient denies blurred vision.  ENMT: No history of ear pain.  Cardiovascular: + for atypical \"chest tightness\"  Respiratory: There is no history of shortness of breath.  Gastrointestinal: No history of abdominal pain, nausea or vomiting.  Genitourinary: There is no history of dysuria.  Musculoskeletal: Denies history of new joint pain.   Skin: No history of skin rashes.  Neurologic: No history of numbness, tingling or weakness.  All other systems that were reviewed are negative.     LABS:  No results for input(s): HGB, BUN, CREATININE, HGBA1C, CRP, POTASSIUM, NTPROB, BNP, CHOLESTEROL, HDL, CHOHDL, TRIGLYCERIDE, CALCLDL, AST, GPT, INR, DIG, LDLDIR, TSH in the last 8765 hours.      VITAL SIGNS:  Vitals:    03/20/23 1616   BP: 116/77   Pulse: 77       PHYSICAL ASSESSMENT:   CONSTITUTIONAL:  Well-developed, well-nourished.  PSYCHIATRIC/NEUROLOGIC:  Comfortable and in no apparent distress.  Alert and oriented x3.  In a good mood.  SKIN:  Soft, warm, and dry, without rashes or bruises.    LUNGS:  Respiratory effort is unlabored.  Lungs are clear without wheezing or crackles.  CARDIAC EXAM:   The PMI is non-displaced.  Auscultation reveals a normal S1, normal S2.  No S3 or S4.   No murmurs, clicks, or pericardial friction rub.   EXTREMITIES:  Without clubbing, cyanosis or edema.  Femoral and pedal pulses intact.       ASSESSMENT/PLAN:    Coronary Artery Disease: Per most recent cardiac cath on 03/09/2022. Nonobstructive disease in left main, LAD proximal portion 70% stenosis by IVUS, Lcx patent prior from circ to OM, RCA: Occluded at the ostium, distal vessel received collateral from left coronary artery, grade 2. IVUS of the LAD showed concentric most likely plaque at the proximal LAD with minimal luminal area of 3.5 mm² and a stenosis 71.6%. EF per recent Echo normal. Patient continues to experience symptoms such as pain in his neck with exertion. He reports twinges in his chest which last for a few seconds. He also continues to note fatigue and activity intolerance. He also is having atypical symptoms such as headache. He is scheduled to undergo CABG x1 on 03/24 at [facility]. We discussed today proceeding with bypass vs more non-invasive work up, including treadmill stress test. We are unable to view the disks from his cardiac cath at [facility] unfortunately. We discussed that we would attempt to quickly obtain these records and decide from there which the best option would be. He will attempt to schedule the stress test in the meantime.     Sleep apnea: Non-compliant with CPAP.     Hx of tobacco abuse: Quit 7 years ago.      I will see the patient back in 2 Weeks or sooner if problems arise.    On 3/20/2023, IDanielle RN scribed the services personally performed by Marcelino Vaca MD.     care, IV fluids, pain control. NPO for now  - trend daily LFTs     Thank you for allowing us to participate in this patient's care.    Meghan Oviedo PA-C  [facility], [facility]

## 2023-09-14 ENCOUNTER — ANESTHESIA (OUTPATIENT)
Dept: ENDOSCOPY | Facility: HOSPITAL | Age: 33
DRG: 440 | End: 2023-09-14
Payer: MEDICAID

## 2023-09-14 PROBLEM — E80.6 HYPERBILIRUBINEMIA: Status: ACTIVE | Noted: 2023-09-14

## 2023-09-14 PROBLEM — R79.89 ELEVATED LFTS: Status: ACTIVE | Noted: 2023-09-14

## 2023-09-14 PROBLEM — K80.50 CHOLEDOCHOLITHIASIS: Status: ACTIVE | Noted: 2023-09-14

## 2023-09-14 PROBLEM — R11.2 NAUSEA AND VOMITING: Status: ACTIVE | Noted: 2023-09-14

## 2023-09-14 PROBLEM — K85.90 ACUTE PANCREATITIS: Status: ACTIVE | Noted: 2023-09-14

## 2023-09-14 PROBLEM — R10.13 EPIGASTRIC PAIN: Status: ACTIVE | Noted: 2023-09-14

## 2023-09-14 LAB
ALBUMIN SERPL-MCNC: 3.6 G/DL (ref 3.5–5)
ALBUMIN/GLOB SERPL: 1.4 RATIO (ref 1.1–2)
ALP SERPL-CCNC: 284 UNIT/L (ref 40–150)
ALT SERPL-CCNC: 619 UNIT/L (ref 0–55)
AST SERPL-CCNC: 408 UNIT/L (ref 5–34)
BASOPHILS # BLD AUTO: 0.03 X10(3)/MCL
BASOPHILS NFR BLD AUTO: 0.7 %
BILIRUB SERPL-MCNC: 3.1 MG/DL
BUN SERPL-MCNC: 6.5 MG/DL (ref 7–18.7)
CALCIUM SERPL-MCNC: 8.9 MG/DL (ref 8.4–10.2)
CHLORIDE SERPL-SCNC: 109 MMOL/L (ref 98–107)
CO2 SERPL-SCNC: 20 MMOL/L (ref 22–29)
CREAT SERPL-MCNC: 0.62 MG/DL (ref 0.55–1.02)
EOSINOPHIL # BLD AUTO: 0.04 X10(3)/MCL (ref 0–0.9)
EOSINOPHIL NFR BLD AUTO: 0.9 %
ERYTHROCYTE [DISTWIDTH] IN BLOOD BY AUTOMATED COUNT: 13.5 % (ref 11.5–17)
GFR SERPLBLD CREATININE-BSD FMLA CKD-EPI: >60 MLS/MIN/1.73/M2
GLOBULIN SER-MCNC: 2.6 GM/DL (ref 2.4–3.5)
GLUCOSE SERPL-MCNC: 90 MG/DL (ref 74–100)
HCT VFR BLD AUTO: 33.4 % (ref 37–47)
HGB BLD-MCNC: 11.5 G/DL (ref 12–16)
IMM GRANULOCYTES # BLD AUTO: 0.01 X10(3)/MCL (ref 0–0.04)
IMM GRANULOCYTES NFR BLD AUTO: 0.2 %
INR PPP: 1
LYMPHOCYTES # BLD AUTO: 1.79 X10(3)/MCL (ref 0.6–4.6)
LYMPHOCYTES NFR BLD AUTO: 41.2 %
MCH RBC QN AUTO: 29.3 PG (ref 27–31)
MCHC RBC AUTO-ENTMCNC: 34.4 G/DL (ref 33–36)
MCV RBC AUTO: 85 FL (ref 80–94)
MONOCYTES # BLD AUTO: 0.26 X10(3)/MCL (ref 0.1–1.3)
MONOCYTES NFR BLD AUTO: 6 %
NEUTROPHILS # BLD AUTO: 2.21 X10(3)/MCL (ref 2.1–9.2)
NEUTROPHILS NFR BLD AUTO: 51 %
NRBC BLD AUTO-RTO: 0 %
PLATELET # BLD AUTO: 260 X10(3)/MCL (ref 130–400)
PMV BLD AUTO: 10.9 FL (ref 7.4–10.4)
POTASSIUM SERPL-SCNC: 3.9 MMOL/L (ref 3.5–5.1)
PROT SERPL-MCNC: 6.2 GM/DL (ref 6.4–8.3)
PROTHROMBIN TIME: 13.3 SECONDS (ref 12.5–14.5)
RBC # BLD AUTO: 3.93 X10(6)/MCL (ref 4.2–5.4)
SODIUM SERPL-SCNC: 136 MMOL/L (ref 136–145)
WBC # SPEC AUTO: 4.34 X10(3)/MCL (ref 4.5–11.5)

## 2023-09-14 PROCEDURE — 37000009 HC ANESTHESIA EA ADD 15 MINS: Performed by: INTERNAL MEDICINE

## 2023-09-14 PROCEDURE — 43264 ERCP REMOVE DUCT CALCULI: CPT | Performed by: INTERNAL MEDICINE

## 2023-09-14 PROCEDURE — 85610 PROTHROMBIN TIME: CPT | Performed by: STUDENT IN AN ORGANIZED HEALTH CARE EDUCATION/TRAINING PROGRAM

## 2023-09-14 PROCEDURE — 63600175 PHARM REV CODE 636 W HCPCS: Performed by: NURSE PRACTITIONER

## 2023-09-14 PROCEDURE — 63600175 PHARM REV CODE 636 W HCPCS: Performed by: NURSE ANESTHETIST, CERTIFIED REGISTERED

## 2023-09-14 PROCEDURE — 63600175 PHARM REV CODE 636 W HCPCS: Performed by: INTERNAL MEDICINE

## 2023-09-14 PROCEDURE — D9220A PRA ANESTHESIA: ICD-10-PCS | Mod: ANES,,, | Performed by: ANESTHESIOLOGY

## 2023-09-14 PROCEDURE — D9220A PRA ANESTHESIA: ICD-10-PCS | Mod: CRNA,,, | Performed by: NURSE ANESTHETIST, CERTIFIED REGISTERED

## 2023-09-14 PROCEDURE — D9220A PRA ANESTHESIA: Mod: ANES,,, | Performed by: ANESTHESIOLOGY

## 2023-09-14 PROCEDURE — 80053 COMPREHEN METABOLIC PANEL: CPT | Performed by: STUDENT IN AN ORGANIZED HEALTH CARE EDUCATION/TRAINING PROGRAM

## 2023-09-14 PROCEDURE — C1769 GUIDE WIRE: HCPCS | Performed by: INTERNAL MEDICINE

## 2023-09-14 PROCEDURE — 43262 ENDO CHOLANGIOPANCREATOGRAPH: CPT | Performed by: INTERNAL MEDICINE

## 2023-09-14 PROCEDURE — 63600175 PHARM REV CODE 636 W HCPCS: Performed by: ANESTHESIOLOGY

## 2023-09-14 PROCEDURE — 21400001 HC TELEMETRY ROOM

## 2023-09-14 PROCEDURE — 27201423 OPTIME MED/SURG SUP & DEVICES STERILE SUPPLY: Performed by: INTERNAL MEDICINE

## 2023-09-14 PROCEDURE — 85025 COMPLETE CBC W/AUTO DIFF WBC: CPT | Performed by: STUDENT IN AN ORGANIZED HEALTH CARE EDUCATION/TRAINING PROGRAM

## 2023-09-14 PROCEDURE — 37000008 HC ANESTHESIA 1ST 15 MINUTES: Performed by: INTERNAL MEDICINE

## 2023-09-14 PROCEDURE — D9220A PRA ANESTHESIA: Mod: CRNA,,, | Performed by: NURSE ANESTHETIST, CERTIFIED REGISTERED

## 2023-09-14 PROCEDURE — 25000003 PHARM REV CODE 250: Performed by: NURSE ANESTHETIST, CERTIFIED REGISTERED

## 2023-09-14 RX ORDER — NALOXONE HCL 0.4 MG/ML
0.02 VIAL (ML) INJECTION
Status: DISCONTINUED | OUTPATIENT
Start: 2023-09-14 | End: 2023-09-15 | Stop reason: HOSPADM

## 2023-09-14 RX ORDER — ONDANSETRON 2 MG/ML
INJECTION INTRAMUSCULAR; INTRAVENOUS
Status: DISCONTINUED | OUTPATIENT
Start: 2023-09-14 | End: 2023-09-14

## 2023-09-14 RX ORDER — IBUPROFEN 200 MG
16 TABLET ORAL
Status: DISCONTINUED | OUTPATIENT
Start: 2023-09-14 | End: 2023-09-15 | Stop reason: HOSPADM

## 2023-09-14 RX ORDER — ROCURONIUM BROMIDE 10 MG/ML
INJECTION, SOLUTION INTRAVENOUS
Status: DISCONTINUED | OUTPATIENT
Start: 2023-09-14 | End: 2023-09-14

## 2023-09-14 RX ORDER — SODIUM CHLORIDE 0.9 % (FLUSH) 0.9 %
10 SYRINGE (ML) INJECTION EVERY 12 HOURS PRN
Status: DISCONTINUED | OUTPATIENT
Start: 2023-09-14 | End: 2023-09-15 | Stop reason: HOSPADM

## 2023-09-14 RX ORDER — INDOMETHACIN 50 MG/1
100 SUPPOSITORY RECTAL SEE ADMIN INSTRUCTIONS
Status: CANCELLED | OUTPATIENT
Start: 2023-09-14

## 2023-09-14 RX ORDER — GLYCOPYRROLATE 0.2 MG/ML
INJECTION INTRAMUSCULAR; INTRAVENOUS
Status: DISCONTINUED | OUTPATIENT
Start: 2023-09-14 | End: 2023-09-14

## 2023-09-14 RX ORDER — ONDANSETRON 2 MG/ML
4 INJECTION INTRAMUSCULAR; INTRAVENOUS ONCE AS NEEDED
Status: DISCONTINUED | OUTPATIENT
Start: 2023-09-14 | End: 2023-09-15 | Stop reason: HOSPADM

## 2023-09-14 RX ORDER — FAMOTIDINE 20 MG/1
40 TABLET, FILM COATED ORAL ONCE
Status: CANCELLED | OUTPATIENT
Start: 2023-09-14

## 2023-09-14 RX ORDER — SODIUM CHLORIDE, SODIUM GLUCONATE, SODIUM ACETATE, POTASSIUM CHLORIDE AND MAGNESIUM CHLORIDE 30; 37; 368; 526; 502 MG/100ML; MG/100ML; MG/100ML; MG/100ML; MG/100ML
INJECTION, SOLUTION INTRAVENOUS CONTINUOUS
Status: CANCELLED | OUTPATIENT
Start: 2023-09-14 | End: 2023-10-14

## 2023-09-14 RX ORDER — MIDAZOLAM HYDROCHLORIDE 1 MG/ML
INJECTION INTRAMUSCULAR; INTRAVENOUS
Status: DISPENSED
Start: 2023-09-14 | End: 2023-09-14

## 2023-09-14 RX ORDER — SODIUM CHLORIDE, SODIUM LACTATE, POTASSIUM CHLORIDE, CALCIUM CHLORIDE 600; 310; 30; 20 MG/100ML; MG/100ML; MG/100ML; MG/100ML
INJECTION, SOLUTION INTRAVENOUS CONTINUOUS
Status: DISCONTINUED | OUTPATIENT
Start: 2023-09-14 | End: 2023-09-15 | Stop reason: HOSPADM

## 2023-09-14 RX ORDER — MIDAZOLAM HYDROCHLORIDE 1 MG/ML
2 INJECTION INTRAMUSCULAR; INTRAVENOUS ONCE
Status: COMPLETED | OUTPATIENT
Start: 2023-09-14 | End: 2023-09-14

## 2023-09-14 RX ORDER — SODIUM CHLORIDE, SODIUM GLUCONATE, SODIUM ACETATE, POTASSIUM CHLORIDE AND MAGNESIUM CHLORIDE 30; 37; 368; 526; 502 MG/100ML; MG/100ML; MG/100ML; MG/100ML; MG/100ML
INJECTION, SOLUTION INTRAVENOUS CONTINUOUS
Status: DISCONTINUED | OUTPATIENT
Start: 2023-09-14 | End: 2023-09-14

## 2023-09-14 RX ORDER — MIDAZOLAM HYDROCHLORIDE 1 MG/ML
INJECTION INTRAMUSCULAR; INTRAVENOUS
Status: DISCONTINUED | OUTPATIENT
Start: 2023-09-14 | End: 2023-09-14

## 2023-09-14 RX ORDER — FENTANYL CITRATE 50 UG/ML
INJECTION, SOLUTION INTRAMUSCULAR; INTRAVENOUS
Status: DISCONTINUED | OUTPATIENT
Start: 2023-09-14 | End: 2023-09-14

## 2023-09-14 RX ORDER — GLUCAGON 1 MG
1 KIT INJECTION
Status: DISCONTINUED | OUTPATIENT
Start: 2023-09-14 | End: 2023-09-15 | Stop reason: HOSPADM

## 2023-09-14 RX ORDER — INDOMETHACIN 50 MG/1
SUPPOSITORY RECTAL
Status: DISPENSED
Start: 2023-09-14 | End: 2023-09-15

## 2023-09-14 RX ORDER — PROPOFOL 10 MG/ML
VIAL (ML) INTRAVENOUS
Status: DISCONTINUED | OUTPATIENT
Start: 2023-09-14 | End: 2023-09-14

## 2023-09-14 RX ORDER — ENOXAPARIN SODIUM 100 MG/ML
40 INJECTION SUBCUTANEOUS EVERY 24 HOURS
Status: DISCONTINUED | OUTPATIENT
Start: 2023-09-14 | End: 2023-09-14

## 2023-09-14 RX ORDER — ACETAMINOPHEN 10 MG/ML
1000 INJECTION, SOLUTION INTRAVENOUS ONCE
Status: COMPLETED | OUTPATIENT
Start: 2023-09-14 | End: 2023-09-14

## 2023-09-14 RX ORDER — LIDOCAINE HYDROCHLORIDE 20 MG/ML
INJECTION, SOLUTION EPIDURAL; INFILTRATION; INTRACAUDAL; PERINEURAL
Status: DISCONTINUED | OUTPATIENT
Start: 2023-09-14 | End: 2023-09-14

## 2023-09-14 RX ORDER — IBUPROFEN 200 MG
24 TABLET ORAL
Status: DISCONTINUED | OUTPATIENT
Start: 2023-09-14 | End: 2023-09-15 | Stop reason: HOSPADM

## 2023-09-14 RX ADMIN — SODIUM CHLORIDE, POTASSIUM CHLORIDE, SODIUM LACTATE AND CALCIUM CHLORIDE: 600; 310; 30; 20 INJECTION, SOLUTION INTRAVENOUS at 01:09

## 2023-09-14 RX ADMIN — FENTANYL CITRATE 100 MCG: 50 INJECTION, SOLUTION INTRAMUSCULAR; INTRAVENOUS at 01:09

## 2023-09-14 RX ADMIN — ACETAMINOPHEN 1000 MG: 10 INJECTION, SOLUTION INTRAVENOUS at 05:09

## 2023-09-14 RX ADMIN — SUGAMMADEX 200 MG: 100 INJECTION, SOLUTION INTRAVENOUS at 01:09

## 2023-09-14 RX ADMIN — ROCURONIUM BROMIDE 50 MG: 10 SOLUTION INTRAVENOUS at 01:09

## 2023-09-14 RX ADMIN — FENTANYL CITRATE 50 MCG: 50 INJECTION, SOLUTION INTRAMUSCULAR; INTRAVENOUS at 01:09

## 2023-09-14 RX ADMIN — ONDANSETRON 4 MG: 2 INJECTION INTRAMUSCULAR; INTRAVENOUS at 01:09

## 2023-09-14 RX ADMIN — MIDAZOLAM HYDROCHLORIDE 2 MG: 1 INJECTION, SOLUTION INTRAMUSCULAR; INTRAVENOUS at 11:09

## 2023-09-14 RX ADMIN — SODIUM CHLORIDE, POTASSIUM CHLORIDE, SODIUM LACTATE AND CALCIUM CHLORIDE: 600; 310; 30; 20 INJECTION, SOLUTION INTRAVENOUS at 08:09

## 2023-09-14 RX ADMIN — PROPOFOL 150 MG: 10 INJECTION, EMULSION INTRAVENOUS at 01:09

## 2023-09-14 RX ADMIN — SODIUM CHLORIDE, POTASSIUM CHLORIDE, SODIUM LACTATE AND CALCIUM CHLORIDE: 600; 310; 30; 20 INJECTION, SOLUTION INTRAVENOUS at 10:09

## 2023-09-14 RX ADMIN — LIDOCAINE HYDROCHLORIDE 80 MG: 20 INJECTION, SOLUTION INTRAVENOUS at 01:09

## 2023-09-14 RX ADMIN — MIDAZOLAM 2 MG: 1 INJECTION INTRAMUSCULAR; INTRAVENOUS at 01:09

## 2023-09-14 RX ADMIN — GLYCOPYRROLATE 0.2 MG: 0.2 INJECTION INTRAMUSCULAR; INTRAVENOUS at 01:09

## 2023-09-14 RX ADMIN — PROPOFOL 50 MG: 10 INJECTION, EMULSION INTRAVENOUS at 01:09

## 2023-09-14 NOTE — ANESTHESIA PREPROCEDURE EVALUATION
09/14/2023  Autumn Alegria is a 33 y.o., female elev bili. S/p GB 5/30/2022.  Pre-operative evaluation for Procedure(s) (LRB):  ERCP (N/A)    Autumn Alegria is a 33 y.o. female     There is no problem list on file for this patient.      Review of patient's allergies indicates:  No Known Allergies    No current facility-administered medications on file prior to encounter.     Current Outpatient Medications on File Prior to Encounter   Medication Sig Dispense Refill    amLODIPine (NORVASC) 5 MG tablet Take 1 tablet by mouth once daily.      amoxicillin (AMOXIL) 875 MG tablet Take 875 mg by mouth 2 (two) times daily.      zolpidem (AMBIEN) 10 mg Tab 1 tablet nightly as needed.         Past Surgical History:   Procedure Laterality Date    CHOLECYSTECTOMY         CBC:   Recent Labs     09/13/23  0708 09/14/23  0402   WBC 6.58 4.34*   RBC 4.49 3.93*   HGB 12.7 11.5*   HCT 38.1 33.4*    260   MCV 84.9 85.0   MCH 28.3 29.3   MCHC 33.3 34.4       CMP:   Recent Labs     09/13/23  0708 09/14/23  0402   * 136   K 3.9 3.9   CO2 19* 20*   BUN 10.5 6.5*   CREATININE 0.72 0.62   CALCIUM 9.5 8.9   ALBUMIN 4.2 3.6   ALKPHOS 289* 284*   * 619*   AST 1,231* 408*   BILITOT 2.3* 3.1*       INR  Recent Labs     09/14/23  0402   INR 1.0   PROTIME 13.3     Diagnostic Studies:  CXR :    EKG 9/13/23: VT=701. RBBB. Inf infarct, age?      2D Echo :  No results found for this or any previous visit..      Pre-op Assessment    I have reviewed the Patient Summary Reports.    I have reviewed the NPO Status.   I have reviewed the Medications.     Review of Systems  Anesthesia Hx:  No problems with previous Anesthesia  History of prior surgery of interest to airway management or planning: Previous anesthesia: General 5/30/22 GB: EM,Mil2,Gr1x1,ET7.5; with general anesthesia.  Airway issues documented on chart review  include GETA, mask, easy  Denies Family Hx of Anesthesia complications.   Denies Personal Hx of Anesthesia complications.   Social:  Alcohol Use, Non-Smoker 4 beers/other wekend.   Hematology/Oncology:  Hematology Normal   Oncology Normal     EENT/Dental:EENT/Dental Normal   Cardiovascular:   Denies Pacemaker. Hypertension   Denies Angina.  Denies WILLIS. ECG has been reviewed.    Pulmonary:  Pulmonary Normal  Denies Asthma.  Denies Sleep Apnea.    Renal/:  Renal/ Normal     Hepatic/GI:  Hepatic/GI Normal  Denies GERD.    Musculoskeletal:  Musculoskeletal Normal    Neurological:   Denies CVA. Headaches Denies Seizures.    Endocrine:  Endocrine Normal  Obesity / BMI > 30  Dermatological:  Skin Normal    Psych:  Psychiatric Normal           Physical Exam  General: Cooperative, Alert and Oriented    Airway:  Mallampati: II / I  Mouth Opening: Normal  TM Distance: Normal  Tongue: Normal  Neck ROM: Normal ROM    Dental:  Intact  Px denies any loose teeth. Overbite.  Chest/Lungs:  Normal Respiratory Rate    Heart:  Rate: Normal  Rhythm: Regular Rhythm    Abdomen:  Normal, Soft        Anesthesia Plan  Type of Anesthesia, risks & benefits discussed:    Anesthesia Type: Gen ETT  Intra-op Monitoring Plan: Standard ASA Monitors  Post Op Pain Control Plan: multimodal analgesia  Induction:  IV  Airway Plan: Direct, Post-Induction  Informed Consent: Informed consent signed with the Patient and all parties understand the risks and agree with anesthesia plan.  All questions answered.   ASA Score: 3  Day of Surgery Review of History & Physical: H&P Update referred to the surgeon/provider.I have interviewed and examined the patient. I have reviewed the patient's H&P dated:     Ready For Surgery From Anesthesia Perspective.     .

## 2023-09-14 NOTE — NURSING
Nurses Note -- 4 Eyes      9/14/2023   1:21 AM      Skin assessed during: Admit      [x] No Altered Skin Integrity Present    []Prevention Measures Documented      [] Yes- Altered Skin Integrity Present or Discovered   [] LDA Added if Not in Epic (Describe Wound)   [] New Altered Skin Integrity was Present on Admit and Documented in LDA   [] Wound Image Taken    Wound Care Consulted? No    Attending Nurse:  Izzy Key RN/Staff Member:   Cornelia Reyna

## 2023-09-14 NOTE — ANESTHESIA PROCEDURE NOTES
Intubation    Date/Time: 9/14/2023 1:30 PM    Performed by: Rosalba Peralta CRNA  Authorized by: Alexandrea Yu MD    Intubation:     Induction:  Intravenous    Intubated:  Postinduction    Mask Ventilation:  Easy mask    Attempts:  1    Attempted By:  Student    Method of Intubation:  Direct    Blade:  Ramirez 2    Laryngeal View Grade: Grade I - full view of cords      Difficult Airway Encountered?: No      Complications:  None    Airway Device:  Oral endotracheal tube    Airway Device Size:  7.5    Style/Cuff Inflation:  Cuffed (inflated to minimal occlusive pressure)    Inflation Amount (mL):  7    Tube secured:  22    Secured at:  The lips    Placement Verified By:  Capnometry    Complicating Factors:  None    Findings Post-Intubation:  BS equal bilateral and atraumatic/condition of teeth unchanged

## 2023-09-14 NOTE — PROGRESS NOTES
"Gastroenterology Progress Note    Subjective/Interval History:  This pleasant 33-year-old presents with abdominal pain and nausea and chills, all reminiscent of her previous gallbladder concerns.  She underwent a cholecystectomy approximately 1 year ago.  On admission she was found to have evidence for biliary pancreatitis with profound transaminase elevation, and elevated bilirubin and alkaline phosphatase, and elevated pancreatic enzymes.  There was no clear-cut pancreatic pathology or biliary ductal dilatation evident on CT.  The patient has undergone a subsequent MRCP which failed to delineate any ductal dilatation or clear-cut choledocholithiasis.  However, clinically this is a case of biliary pancreatitis, and I think given the fact that today's bilirubin is on the rise, we would be remiss if we do not propose an ERCP to clear her duct once and for all.  I had a lengthy discussion with her regarding the attendant risks and complications of ERCP including but not limited to adverse reaction to sedation, bleeding and perforation, and pancreatitis.  I think she is best served by proceeding with an ERCP attempt today.  Her discomfort is improving, she is not having nausea or vomiting, and her vitals are stable    ROS:  ROS    Vital Signs:  /85 (BP Location: Left arm, Patient Position: Lying)   Pulse 81   Temp 97.7 °F (36.5 °C) (Skin)   Resp (!) 21   Ht 5' 3" (1.6 m)   Wt 93.4 kg (206 lb)   LMP 08/23/2023   SpO2 100%   Breastfeeding No   BMI 36.49 kg/m²   Body mass index is 36.49 kg/m².    Physical Exam:  Physical Exam  Constitutional:       Appearance: Normal appearance.   HENT:      Head: Normocephalic and atraumatic.      Mouth/Throat:      Mouth: Mucous membranes are moist.   Eyes:      Extraocular Movements: Extraocular movements intact.      Pupils: Pupils are equal, round, and reactive to light.   Cardiovascular:      Rate and Rhythm: Normal rate and regular rhythm.      Heart sounds: Normal " heart sounds.   Pulmonary:      Effort: Pulmonary effort is normal.      Breath sounds: Normal breath sounds.   Abdominal:      Palpations: Abdomen is soft.      Comments: Mild epigastric tenderness   Musculoskeletal:         General: No swelling.      Right lower leg: No edema.      Left lower leg: No edema.   Skin:     General: Skin is warm and dry.   Neurological:      General: No focal deficit present.      Mental Status: She is alert and oriented to person, place, and time.   Psychiatric:         Mood and Affect: Mood normal.         Behavior: Behavior normal.         Labs:  Recent Results (from the past 48 hour(s))   Urinalysis, Reflex to Urine Culture    Collection Time: 09/13/23  6:56 AM    Specimen: Urine   Result Value Ref Range    Color, UA Dark Yellow Yellow, Light-Yellow, Dark Yellow, Joi, Straw    Appearance, UA Clear Clear    Specific Gravity, UA 1.030 1.005 - 1.030    pH, UA 5.5 5.0 - 8.5    Protein, UA 1+ (A) Negative    Glucose, UA Negative Negative, Normal    Ketones, UA Negative Negative    Blood, UA Trace (A) Negative    Bilirubin, UA 2+ (A) Negative    Urobilinogen, UA 1.0 0.2, 1.0, Normal    Nitrites, UA Negative Negative    Leukocyte Esterase, UA Trace (A) Negative   Urinalysis, Microscopic    Collection Time: 09/13/23  6:56 AM   Result Value Ref Range    RBC, UA 0-5 None Seen, 0-2, 3-5, 0-5 /HPF    WBC, UA 0-5 None Seen, 0-2, 3-5, 0-5 /HPF    Squamous Epithelial Cells, UA 0-4 0-4, None Seen /HPF    Bacteria, UA None Seen None Seen, Rare, Occasional /HPF   Pregnancy, urine rapid    Collection Time: 09/13/23  6:56 AM   Result Value Ref Range    Beta hCG Qualitative, Urine Negative Negative   Comprehensive metabolic panel    Collection Time: 09/13/23  7:08 AM   Result Value Ref Range    Sodium Level 135 (L) 136 - 145 mmol/L    Potassium Level 3.9 3.5 - 5.1 mmol/L    Chloride 106 98 - 107 mmol/L    Carbon Dioxide 19 (L) 22 - 29 mmol/L    Glucose Level 107 (H) 74 - 100 mg/dL    Blood Urea  Nitrogen 10.5 7.0 - 18.7 mg/dL    Creatinine 0.72 0.55 - 1.02 mg/dL    Calcium Level Total 9.5 8.4 - 10.2 mg/dL    Protein Total 7.3 6.4 - 8.3 gm/dL    Albumin Level 4.2 3.5 - 5.0 g/dL    Globulin 3.1 2.4 - 3.5 gm/dL    Albumin/Globulin Ratio 1.4 1.1 - 2.0 ratio    Bilirubin Total 2.3 (H) <=1.5 mg/dL    Alkaline Phosphatase 289 (H) 40 - 150 unit/L    Alanine Aminotransferase 874 (H) 0 - 55 unit/L    Aspartate Aminotransferase 1,231 (H) 5 - 34 unit/L    eGFR >60 mls/min/1.73/m2   Lipase    Collection Time: 09/13/23  7:08 AM   Result Value Ref Range    Lipase Level 913 (H) <=60 U/L   CBC with Differential    Collection Time: 09/13/23  7:08 AM   Result Value Ref Range    WBC 6.58 4.50 - 11.50 x10(3)/mcL    RBC 4.49 4.20 - 5.40 x10(6)/mcL    Hgb 12.7 12.0 - 16.0 g/dL    Hct 38.1 37.0 - 47.0 %    MCV 84.9 80.0 - 94.0 fL    MCH 28.3 27.0 - 31.0 pg    MCHC 33.3 33.0 - 36.0 g/dL    RDW 13.3 11.5 - 17.0 %    Platelet 329 130 - 400 x10(3)/mcL    MPV 10.7 (H) 7.4 - 10.4 fL    Neut % 65.6 %    Lymph % 27.1 %    Mono % 5.9 %    Eos % 0.5 %    Basophil % 0.6 %    Lymph # 1.78 0.6 - 4.6 x10(3)/mcL    Neut # 4.32 2.1 - 9.2 x10(3)/mcL    Mono # 0.39 0.1 - 1.3 x10(3)/mcL    Eos # 0.03 0 - 0.9 x10(3)/mcL    Baso # 0.04 <=0.2 x10(3)/mcL    IG# 0.02 0 - 0.04 x10(3)/mcL    IG% 0.3 %    NRBC% 0.0 %   Troponin I    Collection Time: 09/13/23  7:08 AM   Result Value Ref Range    Troponin-I <0.010 0.000 - 0.045 ng/mL   Hepatitis Panel, Acute    Collection Time: 09/13/23  7:08 AM   Result Value Ref Range    Hepatitis A IgM Nonreactive Nonreactive    Hepatitis B Core IgM Nonreactive Nonreactive    Hepatitis B Surface Antigen Nonreactive Nonreactive    Hep C Ab Interp Nonreactive Nonreactive   Lipid Panel    Collection Time: 09/13/23  3:20 PM   Result Value Ref Range    Cholesterol Total 176 <=200 mg/dL    HDL Cholesterol 50 35 - 60 mg/dL    Triglyceride 89 37 - 140 mg/dL    Cholesterol/HDL Ratio 4 0 - 5    Very Low Density Lipoprotein 18      LDL Cholesterol 108.00 50.00 - 140.00 mg/dL   Protime-INR    Collection Time: 09/14/23  4:02 AM   Result Value Ref Range    PT 13.3 12.5 - 14.5 seconds    INR 1.0 <=1.3   Comprehensive Metabolic Panel    Collection Time: 09/14/23  4:02 AM   Result Value Ref Range    Sodium Level 136 136 - 145 mmol/L    Potassium Level 3.9 3.5 - 5.1 mmol/L    Chloride 109 (H) 98 - 107 mmol/L    Carbon Dioxide 20 (L) 22 - 29 mmol/L    Glucose Level 90 74 - 100 mg/dL    Blood Urea Nitrogen 6.5 (L) 7.0 - 18.7 mg/dL    Creatinine 0.62 0.55 - 1.02 mg/dL    Calcium Level Total 8.9 8.4 - 10.2 mg/dL    Protein Total 6.2 (L) 6.4 - 8.3 gm/dL    Albumin Level 3.6 3.5 - 5.0 g/dL    Globulin 2.6 2.4 - 3.5 gm/dL    Albumin/Globulin Ratio 1.4 1.1 - 2.0 ratio    Bilirubin Total 3.1 (H) <=1.5 mg/dL    Alkaline Phosphatase 284 (H) 40 - 150 unit/L    Alanine Aminotransferase 619 (H) 0 - 55 unit/L    Aspartate Aminotransferase 408 (H) 5 - 34 unit/L    eGFR >60 mls/min/1.73/m2   CBC with Differential    Collection Time: 09/14/23  4:02 AM   Result Value Ref Range    WBC 4.34 (L) 4.50 - 11.50 x10(3)/mcL    RBC 3.93 (L) 4.20 - 5.40 x10(6)/mcL    Hgb 11.5 (L) 12.0 - 16.0 g/dL    Hct 33.4 (L) 37.0 - 47.0 %    MCV 85.0 80.0 - 94.0 fL    MCH 29.3 27.0 - 31.0 pg    MCHC 34.4 33.0 - 36.0 g/dL    RDW 13.5 11.5 - 17.0 %    Platelet 260 130 - 400 x10(3)/mcL    MPV 10.9 (H) 7.4 - 10.4 fL    Neut % 51.0 %    Lymph % 41.2 %    Mono % 6.0 %    Eos % 0.9 %    Basophil % 0.7 %    Lymph # 1.79 0.6 - 4.6 x10(3)/mcL    Neut # 2.21 2.1 - 9.2 x10(3)/mcL    Mono # 0.26 0.1 - 1.3 x10(3)/mcL    Eos # 0.04 0 - 0.9 x10(3)/mcL    Baso # 0.03 <=0.2 x10(3)/mcL    IG# 0.01 0 - 0.04 x10(3)/mcL    IG% 0.2 %    NRBC% 0.0 %       Imaging:  MRI Abdomen W WO Contrast_INC MRCP (XPD)    Result Date: 9/13/2023  EXAMINATION: MRI ABDOMEN WITH AND WO_INC MRCP (XPD) CLINICAL HISTORY: Pancreatitis, acute, severe;r/o choledocholithiasis, acute pancreatitis w/ elevated LFTs. s/p lap julius 5/22;  TECHNIQUE: Multiplanar, multisequence MRI of the abdomen performed prior to and after intravenous administration of gadolinium based contrast.  3D and MIP heavily T2 weighted MRCP sequences included. COMPARISON: CT abdomen pelvis 09/13/2023 FINDINGS: LIVER: Mild loss of signal within the liver on out of phase imaging compatible with mild steatosis. BILIARY: Gallbladder is surgically absent.  No biliary ductal dilatation.  Common bile duct measures 4-5 mm.  No appreciable intraductal stone. PANCREAS: Mild pancreatic edema.  No pancreatic ductal dilatation.  No evidence of necrosis. SPLEEN: Normal. ADRENALS: Normal. KIDNEYS: Symmetric. No hydronephrosis. BOWEL: Unremarkable. BONES: Normal marrow signal. OTHER: N/A     1. No biliary ductal dilatation or evidence of choledocholithiasis 2. Mild pancreatic edema 3. Mild hepatic steatosis Electronically signed by: Alice Gutierrez Date:    09/13/2023 Time:    14:53    CT Abdomen Pelvis With Contrast    Result Date: 9/13/2023  EXAMINATION: CT of the abdomen pelvis with contrast CLINICAL HISTORY: Epigastric pain COMPARISON: None TECHNIQUE: Routine CT of the abdomen pelvis performed with intravenous contrast.  Patient received 100 cc of Isovue 370 Total DLP: 556 mGy.cm Automatic exposure control was utilized to reduce the patient's dose FINDINGS: The visualized lung bases are clear. The liver, spleen, pancreas, adrenal glands, and kidneys appear unremarkable.  The gallbladder is absent. The abdominal aorta normal caliber.  No abdominopelvic adenopathy. There is underdistention versus mild wall thickening of the stomach antrum.  The bowel loops are normal caliber.  The appendix appears normal.  There is trace free fluid in posterior cul-de-sac, possibly physiologic.  No fluid collection or free air. No osseous destructive process.     Mild antral wall thickening/gastritis versus underdistention. Small amount of free fluid the pelvis, possibly physiologic. Otherwise,  unremarkable CT of the abdomen pelvis. Electronically signed by: Ayad Ng MD Date:    09/13/2023 Time:    10:46         Assessment/Plan:  33-year-old with apparent biliary pancreatitis 1 year postcholecystectomy.  No clear-cut choledocholithiasis evident on MRCP, but rising bilirubin.  It seems in her best interest to proceed with ERCP in an effort to clear her duct and prevent recurrent episodes down the line.  I do not know that she passed a small stone, or if a small stone remains, too small to be evident on MRCP       Maverick Martinez PA-C

## 2023-09-14 NOTE — OP NOTE
ERCP Procedure Note    Procedure: ERCP sphincterotomy and balloon extraction    Date of procedure: 09/14/2023     Physician: Maverick Martinez MD    Indications:  Progressive jaundice, biliary pancreatitis, rule out choledocholithiasis    ASA:  2    Consent: Risks, benefits, and alternatives of procedure were explained in detail. Patient was given the opportunity to ask questions and then an informed consent was obtained. Risk of pancreatitis (5-20%) were discussed. Risks of bleeding and perforation were also discussed.    Anesthesia: General    Procedure: The duodenoscope was introduced through the posterior oropharynx into the esophagus, stomach, and duodenum without difficulty. Limited view of mucosa in esophagus and stomach due to nature of side viewing endoscope. Air was evacuated from the stomach and the scope was withdrawn and procedure was completed. The patient tolerated the procedure well and was able to be transferred to the recovery area in stable condition.     Findings:   Endoscopic evaluation fairly straightforward to a normal-appearing papilla.  Visualization limited because of side-viewing scope.  Successful wire cannulation of the common duct without any manipulation of the pancreatic duct.  Opacification found a nondilated biliary tree.  Given the overall scenario we made a small sphincterotomy and swept the duct with a 9 mm balloon catheter.  In fact, we would sweep with 12 and then reduced to 9 to exit the sphincterotomy.  There was a bit of carious debris but no clear-cut calculus.  A small calculus should now exit easily    Impression and Recommendations:  Straightforward ERCP, able to avoid the pancreatic duct, small sphincterotomy and balloon sweep delivered carious debris.  Hopefully she will continue her improvement, and her liver tests will normalize in the short term.    Maverick Martinez MD

## 2023-09-14 NOTE — PROGRESS NOTES
[facility] - 5th Floor [facility] MEDICINE ~ PROGRESS NOTE        CHIEF COMPLAINT   Hospital follow up    HOSPITAL COURSE     Autumn Alegria is a 33 y.o. female with a past medical history of hypertension. The patient presented to [facility] on 9/13/2023 with a primary complaint of epigastric abdominal pain to the back and associated nausea and chills which began yesterday (09/12/2023) evening. She denies complaints of chest pain, shortness a breath, vomiting, diarrhea, cough.  Patient reports he drinks a few beers every other weekend but never has had history of heavy alcohol use or pancreatitis before. Patient reports history of cholecystectomy approximally year ago.     Upon presentation to the ED, temperature 93° F, heart rate 100, blood pressure 130/87, respiratory rate 17 and SpO2 98% on room air. Labs with alkaline phosphatase 289, total bilirubin 2.3, AST 1231, , lipase 913, troponin less than 0.01.  UA negative for infection.  CT abdomen and pelvis with mild anterior wall thickening/gastritis versus underdistention, small amount of free fluid in the pelvis possibly physiologic but otherwise unremarkable CT.  CT abdomen pelvis with sinus tachycardia with ventricular rate of 101, right bundle-branch block and age-indeterminate inferior infarct. In the ED patient received Mylanta.  Patient is admitted to hospital medicine services for further medical management.    Interval History:  NAEON, patient doing well this morning with no new complaints.  States her abdominal pain is improved today.  Denies any F/C, N/V, diarrhea, constipation.  GI following with plan for ERCP today.  Patient remains NPO.        OBJECTIVE/PHYSICAL EXAM     VITAL SIGNS (MOST RECENT):  Temp: 98.5 °F (36.9 °C) (09/14/23 0801)  Pulse: 71 (09/14/23 0801)  Resp: 18 (09/14/23 0004)  BP: 129/80 (09/14/23 0801)  SpO2: 98 % (09/14/23 0801) VITAL SIGNS (24 HOUR RANGE):  Temp:  [98.2 °F (36.8 °C)-98.5 °F (36.9 °C)] 98.5  °F (36.9 °C)  Pulse:  [65-97] 71  Resp:  [18-25] 18  SpO2:  [98 %-100 %] 98 %  BP: (118-149)/() 129/80   GENERAL: In no acute distress, afebrile  HEENT: normocephalic, atraumatic   CHEST: Clear to auscultation bilaterally  HEART: S1, S2, no appreciable murmur  ABDOMEN: Soft, nontender, BS +  MSK: Warm, no lower extremity edema, no clubbing or cyanosis  NEUROLOGIC: Alert and oriented x4, moving all extremities with good strength      ASSESSMENT/PLAN     Acute pancreatitis   Transaminitis - improving   Hyperbilirubinemia   Hx of HTN  Hx of choledocholithiasis s/p lap julius 5/30/2022     -continue NS @125cc/hr for IVF resuscitation   -MRCP performed showing mild pancreatic edema with no biliary ductal dilation or evidence of choledocholithiasis   -GI following, plan for ERCP today for further evaluation  -continue IV morphine for pain, titrate as needed   -patient to remain NPO prior to procedure but afterwards can advance diet as tolerated.    -lipid panel wnl   -continue to monitor electrolytes and replete as needed  -labs in AM     DVT prophylaxis: SCD's, patient mobile and advised to get out of bed as much as possible.     Anticipated discharge and disposition: home __________________________________________________________________________    LABS/MICRO/MEDS/DIAGNOSTICS       LABS  Recent Labs     09/14/23  0402      K 3.9   CHLORIDE 109*   CO2 20*   BUN 6.5*   CREATININE 0.62   GLUCOSE 90   CALCIUM 8.9   ALKPHOS 284*   *   *   ALBUMIN 3.6     Recent Labs     09/14/23  0402   WBC 4.34*   RBC 3.93*   HCT 33.4*   MCV 85.0          MICROBIOLOGY  Microbiology Results (last 7 days)       ** No results found for the last 168 hours. **               MEDICATIONS   enoxparin  40 mg Subcutaneous Daily    famotidine  40 mg Oral Once         INFUSIONS   sodium chloride 0.9% 125 mL/hr at 09/13/23 2328          DIAGNOSTIC TESTS  MRI Abdomen W WO Contrast_INC MRCP (XPD)   Final Result      1. No  "biliary ductal dilatation or evidence of choledocholithiasis   2. Mild pancreatic edema   3. Mild hepatic steatosis         Electronically signed by: Alice Gutierrez   Date:    09/13/2023   Time:    14:53      CT Abdomen Pelvis With Contrast   Final Result      Mild antral wall thickening/gastritis versus underdistention.      Small amount of free fluid the pelvis, possibly physiologic.      Otherwise, unremarkable CT of the abdomen pelvis.         Electronically signed by: Ayad Ng MD   Date:    09/13/2023   Time:    10:46           No results found for: "EF"       NUTRITION STATUS  Patient meets ASPEN criteria for   malnutrition of   per RD assessment as evidenced by:                       A minimum of two characteristics is recommended for diagnosis of either severe or non-severe malnutrition.       Case related differential diagnoses have been reviewed; assessment and plan has been documented. I have personally reviewed the labs and test results that are currently available; I have reviewed the patients medication list. I have reviewed the consulting providers recommendations. I have reviewed or attempted to review medical records based upon their availability.  All of the patient's and/or family's questions have been addressed and answered to the best of my ability.  I will continue to monitor closely and make adjustments to medical management as needed.  This document was created using M*Modal Fluency Direct.  Transcription errors may have been made.  Please contact me if any questions may rise regarding documentation to clarify transcription.        Bhavna Robledo MD   Internal Medicine  Department of Hospital Medicine  [facility] - 5th Floor Med Surg      "

## 2023-09-14 NOTE — ANESTHESIA POSTPROCEDURE EVALUATION
Anesthesia Post Evaluation    Patient: Autumn Alegria    Procedure(s) Performed: Procedure(s) (LRB):  ERCP (N/A)  ERCP, WITH SPHINCTEROTOMY  ERCP, WITH CALCULUS REMOVAL    Final Anesthesia Type: general      Patient location during evaluation: GI PACU  Patient participation: Yes- Able to Participate  Level of consciousness: awake and alert, awake and oriented  Post-procedure vital signs: reviewed and stable  Pain management: adequate  Airway patency: patent    PONV status at discharge: No PONV  Anesthetic complications: no      Cardiovascular status: blood pressure returned to baseline, hemodynamically stable and stable  Respiratory status: unassisted, spontaneous ventilation and room air  Hydration status: euvolemic  Follow-up not needed.          Vitals Value Taken Time   /87 09/14/23 1432   Temp 36.6 °C (97.9 °F) 09/14/23 1408   Pulse 74 09/14/23 1432   Resp 14 09/14/23 1432   SpO2 99 % 09/14/23 1432         Event Time   Out of Recovery 09/14/2023 14:18:00         Pain/Ivelisse Score: Pain Rating Prior to Med Admin: 6 (9/14/2023  5:43 AM)  Pain Rating Post Med Admin: 3 (9/14/2023  6:13 AM)  Ivelisse Score: 9 (9/14/2023  2:23 PM)

## 2023-09-15 VITALS
RESPIRATION RATE: 17 BRPM | HEIGHT: 63 IN | BODY MASS INDEX: 36.5 KG/M2 | SYSTOLIC BLOOD PRESSURE: 124 MMHG | HEART RATE: 76 BPM | OXYGEN SATURATION: 100 % | WEIGHT: 206 LBS | TEMPERATURE: 98 F | DIASTOLIC BLOOD PRESSURE: 84 MMHG

## 2023-09-15 LAB
ALBUMIN SERPL-MCNC: 3.5 G/DL (ref 3.5–5)
ALBUMIN/GLOB SERPL: 1.5 RATIO (ref 1.1–2)
ALP SERPL-CCNC: 230 UNIT/L (ref 40–150)
ALT SERPL-CCNC: 420 UNIT/L (ref 0–55)
AST SERPL-CCNC: 160 UNIT/L (ref 5–34)
BILIRUB SERPL-MCNC: 1.4 MG/DL
BUN SERPL-MCNC: 6 MG/DL (ref 7–18.7)
CALCIUM SERPL-MCNC: 9.1 MG/DL (ref 8.4–10.2)
CHLORIDE SERPL-SCNC: 109 MMOL/L (ref 98–107)
CO2 SERPL-SCNC: 21 MMOL/L (ref 22–29)
CREAT SERPL-MCNC: 0.72 MG/DL (ref 0.55–1.02)
GFR SERPLBLD CREATININE-BSD FMLA CKD-EPI: >60 MLS/MIN/1.73/M2
GLOBULIN SER-MCNC: 2.4 GM/DL (ref 2.4–3.5)
GLUCOSE SERPL-MCNC: 86 MG/DL (ref 74–100)
LIPASE SERPL-CCNC: 15 U/L
PATH REV: NORMAL
POTASSIUM SERPL-SCNC: 3.9 MMOL/L (ref 3.5–5.1)
PROT SERPL-MCNC: 5.9 GM/DL (ref 6.4–8.3)
SODIUM SERPL-SCNC: 138 MMOL/L (ref 136–145)

## 2023-09-15 PROCEDURE — 80053 COMPREHEN METABOLIC PANEL: CPT

## 2023-09-15 PROCEDURE — 83690 ASSAY OF LIPASE: CPT | Performed by: PHYSICIAN ASSISTANT

## 2023-09-15 PROCEDURE — 63600175 PHARM REV CODE 636 W HCPCS: Performed by: INTERNAL MEDICINE

## 2023-09-15 RX ORDER — ACETAMINOPHEN 325 MG/1
650 TABLET ORAL EVERY 6 HOURS PRN
Status: DISCONTINUED | OUTPATIENT
Start: 2023-09-15 | End: 2023-09-15 | Stop reason: HOSPADM

## 2023-09-15 RX ADMIN — SODIUM CHLORIDE, POTASSIUM CHLORIDE, SODIUM LACTATE AND CALCIUM CHLORIDE: 600; 310; 30; 20 INJECTION, SOLUTION INTRAVENOUS at 06:09

## 2023-09-15 NOTE — PROGRESS NOTES
"Gastroenterology Progress Note      HPI:    1 day s/p ERCP. VSS. Tbili today back down to normal 1.4. No overnight events. In fact her abd pain has completely resolved and her lipase is now 15.  at bedside. She tolerated a regular diet today. Had 1 normal BM. Feels ready to go home.     ROS:    Review of Systems   Constitutional:  Negative for fever, malaise/fatigue and weight loss.   Respiratory:  Negative for cough and shortness of breath.    Cardiovascular:  Negative for chest pain, palpitations and leg swelling.   Gastrointestinal:  Negative for abdominal pain, blood in stool, constipation, diarrhea, heartburn, melena, nausea and vomiting.   Musculoskeletal:  Negative for back pain and myalgias.   Skin:  Negative for rash.   Neurological:  Negative for speech change and focal weakness.   All other systems reviewed and are negative.      Vital Signs:  /84   Pulse 76   Temp 98.3 °F (36.8 °C) (Oral)   Resp 17   Ht 5' 2.99" (1.6 m)   Wt 93.4 kg (206 lb)   LMP 08/23/2023   SpO2 100%   Breastfeeding No   BMI 36.50 kg/m²   Body mass index is 36.5 kg/m².    Physical Exam:    Physical Exam  Vitals and nursing note reviewed.   Constitutional:       Appearance: Normal appearance.   HENT:      Head: Normocephalic and atraumatic.   Eyes:      General: No scleral icterus.     Extraocular Movements: Extraocular movements intact.   Cardiovascular:      Rate and Rhythm: Normal rate and regular rhythm.      Heart sounds: Normal heart sounds.   Pulmonary:      Effort: Pulmonary effort is normal. No respiratory distress.      Breath sounds: Normal breath sounds.   Abdominal:      General: Abdomen is flat. Bowel sounds are normal. There is no distension.      Palpations: Abdomen is soft.      Tenderness: There is no abdominal tenderness.   Musculoskeletal:         General: No swelling or deformity. Normal range of motion.      Right lower leg: No edema.      Left lower leg: No edema.   Skin:     General: Skin " is warm and dry.   Neurological:      General: No focal deficit present.      Mental Status: She is alert and oriented to person, place, and time.   Psychiatric:         Mood and Affect: Mood normal.         Behavior: Behavior normal.         Labs:  Recent Results (from the past 48 hour(s))   Lipid Panel    Collection Time: 09/13/23  3:20 PM   Result Value Ref Range    Cholesterol Total 176 <=200 mg/dL    HDL Cholesterol 50 35 - 60 mg/dL    Triglyceride 89 37 - 140 mg/dL    Cholesterol/HDL Ratio 4 0 - 5    Very Low Density Lipoprotein 18     LDL Cholesterol 108.00 50.00 - 140.00 mg/dL   Protime-INR    Collection Time: 09/14/23  4:02 AM   Result Value Ref Range    PT 13.3 12.5 - 14.5 seconds    INR 1.0 <=1.3   Comprehensive Metabolic Panel    Collection Time: 09/14/23  4:02 AM   Result Value Ref Range    Sodium Level 136 136 - 145 mmol/L    Potassium Level 3.9 3.5 - 5.1 mmol/L    Chloride 109 (H) 98 - 107 mmol/L    Carbon Dioxide 20 (L) 22 - 29 mmol/L    Glucose Level 90 74 - 100 mg/dL    Blood Urea Nitrogen 6.5 (L) 7.0 - 18.7 mg/dL    Creatinine 0.62 0.55 - 1.02 mg/dL    Calcium Level Total 8.9 8.4 - 10.2 mg/dL    Protein Total 6.2 (L) 6.4 - 8.3 gm/dL    Albumin Level 3.6 3.5 - 5.0 g/dL    Globulin 2.6 2.4 - 3.5 gm/dL    Albumin/Globulin Ratio 1.4 1.1 - 2.0 ratio    Bilirubin Total 3.1 (H) <=1.5 mg/dL    Alkaline Phosphatase 284 (H) 40 - 150 unit/L    Alanine Aminotransferase 619 (H) 0 - 55 unit/L    Aspartate Aminotransferase 408 (H) 5 - 34 unit/L    eGFR >60 mls/min/1.73/m2   CBC with Differential    Collection Time: 09/14/23  4:02 AM   Result Value Ref Range    WBC 4.34 (L) 4.50 - 11.50 x10(3)/mcL    RBC 3.93 (L) 4.20 - 5.40 x10(6)/mcL    Hgb 11.5 (L) 12.0 - 16.0 g/dL    Hct 33.4 (L) 37.0 - 47.0 %    MCV 85.0 80.0 - 94.0 fL    MCH 29.3 27.0 - 31.0 pg    MCHC 34.4 33.0 - 36.0 g/dL    RDW 13.5 11.5 - 17.0 %    Platelet 260 130 - 400 x10(3)/mcL    MPV 10.9 (H) 7.4 - 10.4 fL    Neut % 51.0 %    Lymph % 41.2 %    Mono  % 6.0 %    Eos % 0.9 %    Basophil % 0.7 %    Lymph # 1.79 0.6 - 4.6 x10(3)/mcL    Neut # 2.21 2.1 - 9.2 x10(3)/mcL    Mono # 0.26 0.1 - 1.3 x10(3)/mcL    Eos # 0.04 0 - 0.9 x10(3)/mcL    Baso # 0.03 <=0.2 x10(3)/mcL    IG# 0.01 0 - 0.04 x10(3)/mcL    IG% 0.2 %    NRBC% 0.0 %   Comprehensive Metabolic Panel    Collection Time: 09/15/23  3:56 AM   Result Value Ref Range    Sodium Level 138 136 - 145 mmol/L    Potassium Level 3.9 3.5 - 5.1 mmol/L    Chloride 109 (H) 98 - 107 mmol/L    Carbon Dioxide 21 (L) 22 - 29 mmol/L    Glucose Level 86 74 - 100 mg/dL    Blood Urea Nitrogen 6.0 (L) 7.0 - 18.7 mg/dL    Creatinine 0.72 0.55 - 1.02 mg/dL    Calcium Level Total 9.1 8.4 - 10.2 mg/dL    Protein Total 5.9 (L) 6.4 - 8.3 gm/dL    Albumin Level 3.5 3.5 - 5.0 g/dL    Globulin 2.4 2.4 - 3.5 gm/dL    Albumin/Globulin Ratio 1.5 1.1 - 2.0 ratio    Bilirubin Total 1.4 <=1.5 mg/dL    Alkaline Phosphatase 230 (H) 40 - 150 unit/L    Alanine Aminotransferase 420 (H) 0 - 55 unit/L    Aspartate Aminotransferase 160 (H) 5 - 34 unit/L    eGFR >60 mls/min/1.73/m2   Lipase    Collection Time: 09/15/23  3:56 AM   Result Value Ref Range    Lipase Level 15 <=60 U/L         Assessment/Plan:  33 y.o. female known to Dr. Dougherty from inpatient care in 2022, w/ pmhx of HTN and inpatient cholecystectomy at [facility] May 2022 for cholelithiasis. Admitted with acute pancreatitis     Acute biliary pancreatitis secondary to CBD debris  - 2/3 diagnostic criteria med given lipase >900 and epigastric pain radiating into back c/w pancreatitis  - etiology likely biliary.   - TGLs wnl.   - no medication culprits.   - Minimal to moderate etoh use, she drinks a few beers on weekends.   - tbili 2.3---3.1. After ERCP 1.4.      - s/p straightforward ERCP 9/14/23 w/ small sphincterotomy and balloon sweep to remove debris  - lipase 913 to 15 today overnight. Epigastric pain has resolved. She is tolerating all PO intake w/o n/v, or increased pain. Remarkable clinical  improvement.   - ok to d/c from GI standpoint     BELLO MohamudC  [facility]

## 2023-09-15 NOTE — DISCHARGE SUMMARY
[facility] - 5th Floor Med Surg  [facility] MEDICINE - DISCHARGE SUMMARY    Patient Name: Autumn Alegria  MRN: [id number]  Admission Date: 9/13/2023  Discharge Date: 09/15/2023  Hospital Length of Stay: 2 days  Discharge Provider: Bhavna Robledo MD  Primary Care Provider: Tammi, Primary Doctor      HOSPITAL COURSE     Autumn Alegria is a 33 y.o. female with a past medical history of hypertension. The patient presented to [facility] on 9/13/2023 with a primary complaint of epigastric abdominal pain to the back and associated nausea and chills which began yesterday (09/12/2023) evening. She denies complaints of chest pain, shortness a breath, vomiting, diarrhea, cough.  Patient reports he drinks a few beers every other weekend but never has had history of heavy alcohol use or pancreatitis before. Patient reports history of cholecystectomy approximally year ago.     Upon presentation to the ED, temperature 93° F, heart rate 100, blood pressure 130/87, respiratory rate 17 and SpO2 98% on room air. Labs with alkaline phosphatase 289, total bilirubin 2.3, AST 1231, , lipase 913, troponin less than 0.01.  UA negative for infection.  CT abdomen and pelvis with mild anterior wall thickening/gastritis versus underdistention, small amount of free fluid in the pelvis possibly physiologic but otherwise unremarkable CT.  CT abdomen pelvis with sinus tachycardia with ventricular rate of 101, right bundle-branch block and age-indeterminate inferior infarct. In the ED patient received Mylanta.  Patient is admitted to hospital medicine services for further medical management.    MRCP performed showing mild pancreatic edema with no biliary ductal dilation or evidence of choledocholithiasis.  GI consulted for further recommendations and ERCP was performed showing small amount of carious debris but no clear-cut calculus.  A small sphincterotomy and balloon sweep was performed delivering carious debris.  Patient tolerated  procedure well without any complications.  Transaminitis and hyperbilirubinemia continued to improve.  Patient had good clinical improvement and was able to tolerate diet well.  She is stable for discharge at this time.       PHYSICAL EXAM     Most Recent Vital Signs:  Temp: 98.3 °F (36.8 °C) (09/15/23 0830)  Pulse: 69 (09/15/23 0830)  Resp: 17 (09/15/23 0830)  BP: 122/80 (09/15/23 0830)  SpO2: 98 % (09/15/23 0830)   GENERAL: In no acute distress, afebrile  CHEST: Clear to auscultation bilaterally  HEART: S1, S2, no appreciable murmur  ABDOMEN: Soft, nontender, BS +  MSK: Warm, no lower extremity edema, no clubbing or cyanosis  NEUROLOGIC: Alert and oriented x4, moving all extremities with good strength     DISCHARGE DIAGNOSIS     Acute pancreatitis - resolved   Transaminitis - improved  Hyperbilirubinemia - resolved   Hx of HTN  Hx of choledocholithiasis s/p lap julius 5/30/2022     _____________________________________________________________________________      DISCHARGE MED REC     Current Discharge Medication List        CONTINUE these medications which have NOT CHANGED    Details   amLODIPine (NORVASC) 5 MG tablet Take 1 tablet by mouth once daily.      zolpidem (AMBIEN) 10 mg Tab 1 tablet nightly as needed.           STOP taking these medications       amoxicillin (AMOXIL) 875 MG tablet Comments:   Reason for Stopping:                  CONSULTS     Consults (From admission, onward)          Status Ordering Provider     Inpatient consult to Gastroenterology  Once        Provider:  Maverick Martinez MD    Completed CURET IV, GABRIELLE B              FOLLOW UP     Patient instructed to follow-up with PCP in 1 week       DISCHARGE INSTRUCTIONS     Explained in detail to the patient about the discharge plan, medications, and follow-up visits. Pt understands and agrees with the treatment plan.  Discharged Condition: stable  Diet as tolerated  Activities as tolerated  Discharge to: Home or Self Care    TIME SPENT ON  DISCHARGE   35 minutes        Bhavna Robledo MD  Internal Medicine  Department of Hospital Medicine  [facility] - 5th Floor Med Surg      This document was created using electronic dictation services.  Please excuse any errors that may have been made.  Contact me if any questions regarding documentation to clarify verbiage.

## (undated) DEVICE — SOL IRRI STRL WATER 1000ML

## (undated) DEVICE — BLOCK BLOX BITE DENT RIM 54FR

## (undated) DEVICE — ELECTRODE REM PLYHSV RETURN 9

## (undated) DEVICE — EXTRACTOR PRO 9-12MM ABOVE

## (undated) DEVICE — KIT SURGICAL COLON .25 1.1OZ

## (undated) DEVICE — TIP SUCTION YANKAUER

## (undated) DEVICE — DREAMTOME RX 49

## (undated) DEVICE — COLLECTION SPECIMEN NEPTUNE

## (undated) DEVICE — DEVICE LOCKING RX - OLYMPUS

## (undated) DEVICE — SUPPORT ULNA NERVE PROTECTOR